# Patient Record
Sex: FEMALE | Race: WHITE | NOT HISPANIC OR LATINO | Employment: FULL TIME | URBAN - METROPOLITAN AREA
[De-identification: names, ages, dates, MRNs, and addresses within clinical notes are randomized per-mention and may not be internally consistent; named-entity substitution may affect disease eponyms.]

---

## 2024-04-16 ENCOUNTER — OFFICE VISIT (OUTPATIENT)
Dept: FAMILY MEDICINE CLINIC | Facility: CLINIC | Age: 34
End: 2024-04-16
Payer: COMMERCIAL

## 2024-04-16 VITALS
BODY MASS INDEX: 18.68 KG/M2 | HEIGHT: 67 IN | OXYGEN SATURATION: 96 % | WEIGHT: 119 LBS | HEART RATE: 114 BPM | DIASTOLIC BLOOD PRESSURE: 72 MMHG | SYSTOLIC BLOOD PRESSURE: 122 MMHG

## 2024-04-16 DIAGNOSIS — R00.2 PALPITATIONS: Primary | ICD-10-CM

## 2024-04-16 DIAGNOSIS — R10.10 UPPER ABDOMINAL PAIN: ICD-10-CM

## 2024-04-16 DIAGNOSIS — Z13.220 LIPID SCREENING: ICD-10-CM

## 2024-04-16 DIAGNOSIS — Z13.1 SCREENING FOR DIABETES MELLITUS (DM): ICD-10-CM

## 2024-04-16 PROCEDURE — 99204 OFFICE O/P NEW MOD 45 MIN: CPT | Performed by: PHYSICIAN ASSISTANT

## 2024-04-16 PROCEDURE — 93000 ELECTROCARDIOGRAM COMPLETE: CPT | Performed by: PHYSICIAN ASSISTANT

## 2024-04-16 NOTE — ASSESSMENT & PLAN NOTE
Check labs and US abd. Favor some possible mild gastritis so would recommend GERD diet if all findings are otherwise normal.

## 2024-04-16 NOTE — ASSESSMENT & PLAN NOTE
One occurrence of 30 second fast heart rate. EKG WNL. Will check fasting labs. Has had in the past with normal work up. Pt. most likely not conditioned and should more slowly get back into activity and exercising. Used to do ballet when she was younger.

## 2024-04-16 NOTE — PATIENT INSTRUCTIONS
1. Palpitations  Assessment & Plan:  One occurrence of 30 second fast heart rate. EKG WNL. Will check fasting labs. Has had in the past with normal work up. Pt. most likely not conditioned and should more slowly get back into activity and exercising. Used to do ballet when she was younger.     Orders:  -     POCT ECG  -     CBC and differential  -     Comprehensive metabolic panel  -     TSH, 3rd generation with Free T4 reflex    2. Lipid screening  -     Lipid panel    3. Screening for diabetes mellitus (DM)  -     Comprehensive metabolic panel    4. Upper abdominal pain  Assessment & Plan:  Check labs and US abd. Favor some possible mild gastritis so would recommend GERD diet if all findings are otherwise normal.     Orders:  -     US abdomen complete; Future; Expected date: 04/16/2024

## 2024-04-16 NOTE — PROGRESS NOTES
Name: Krystal Barahona      : 1990      MRN: [id number]  Encounter Provider: Rachel Seymour PA-C  Encounter Date: 2024   Encounter department: [facility]    Assessment & Plan     1. Palpitations  Assessment & Plan:  One occurrence of 30 second fast heart rate. EKG WNL. Will check fasting labs. Has had in the past with normal work up. Pt. most likely not conditioned and should more slowly get back into activity and exercising. Used to do ballet when she was younger.     Orders:  -     POCT ECG  -     CBC and differential  -     Comprehensive metabolic panel  -     TSH, 3rd generation with Free T4 reflex    2. Lipid screening  -     Lipid panel    3. Screening for diabetes mellitus (DM)  -     Comprehensive metabolic panel    4. Upper abdominal pain  Assessment & Plan:  Check labs and US abd. Favor some possible mild gastritis so would recommend GERD diet if all findings are otherwise normal.     Orders:  -     US abdomen complete; Future; Expected date: 2024        Depression Screening and Follow-up Plan: Patient was screened for depression during today's encounter. They screened negative with a PHQ-2 score of 0.        Subjective      Patient presents with:  Establish Care: Pt present today to establish care with us and to get scripts for blood work.  Abdominal Pain: Pt complaints of left upper quadrant pain for the past month on and off.  Pt did mention she has a protruding rib  Palpitations: When she was doing a physical activity    Moved from [place] originally from [place]. New to area 6 months. Has a house in all three states. Currently working fashion. Originally from [place].     Pain in her LUQ comes and goes and now more persistent. Pressure. May get worse with eating. BM is daily. Would like to check her pancrease. Increase in gas both ways more upper.     Also has hx of palpitations, had EKG in the past. Had one today. Yesterday during a walk heart beat was hard and fast less  "than 30 seconds and resolved after drinking water. Does feel out of shape but is wanting to restart gym and do blood work.           Review of Systems   Constitutional: Negative.    HENT: Negative.     Eyes: Negative.    Respiratory: Negative.     Cardiovascular:  Positive for palpitations.   Gastrointestinal:  Positive for abdominal pain.   Endocrine: Negative.    Genitourinary: Negative.    Musculoskeletal: Negative.    Skin: Negative.    Allergic/Immunologic: Negative.    Neurological: Negative.    Hematological: Negative.    Psychiatric/Behavioral: Negative.         No current outpatient medications on file prior to visit.       Objective     /72 (BP Location: Right arm, Patient Position: Sitting, Cuff Size: Standard)   Pulse (!) 114   Ht 5' 6.75\" (1.695 m)   Wt 54 kg (119 lb)   SpO2 96%   BMI 18.78 kg/m²     Physical Exam  Vitals and nursing note reviewed.   Constitutional:       General: She is not in acute distress.     Appearance: She is well-developed. She is not diaphoretic.   HENT:      Head: Normocephalic and atraumatic.      Nose: Nose normal.   Eyes:      General:         Right eye: No discharge.         Left eye: No discharge.      Conjunctiva/sclera: Conjunctivae normal.   Neck:      Vascular: No carotid bruit.   Cardiovascular:      Rate and Rhythm: Normal rate and regular rhythm.      Heart sounds: Normal heart sounds. No murmur heard.     No friction rub. No gallop.   Pulmonary:      Effort: Pulmonary effort is normal. No respiratory distress.      Breath sounds: Normal breath sounds. No wheezing or rales.   Musculoskeletal:      Cervical back: Neck supple.   Skin:     General: Skin is warm and dry.   Neurological:      Mental Status: She is alert and oriented to person, place, and time.   Psychiatric:         Judgment: Judgment normal.       Rachel Seymour PA-C    "

## 2024-04-25 ENCOUNTER — TELEPHONE (OUTPATIENT)
Age: 34
End: 2024-04-25

## 2024-04-25 ENCOUNTER — TELEPHONE (OUTPATIENT)
Dept: FAMILY MEDICINE CLINIC | Facility: CLINIC | Age: 34
End: 2024-04-25

## 2024-04-25 ENCOUNTER — HOSPITAL ENCOUNTER (OUTPATIENT)
Dept: ULTRASOUND IMAGING | Facility: HOSPITAL | Age: 34
Discharge: HOME | End: 2024-04-25
Payer: COMMERCIAL

## 2024-04-25 DIAGNOSIS — R10.10 UPPER ABDOMINAL PAIN: ICD-10-CM

## 2024-04-25 LAB
ALBUMIN SERPL-MCNC: 4.2 G/DL (ref 3.9–4.9)
ALBUMIN/GLOB SERPL: 1.8 {RATIO} (ref 1.2–2.2)
ALP SERPL-CCNC: 67 IU/L (ref 44–121)
ALT SERPL-CCNC: 14 IU/L (ref 0–32)
AST SERPL-CCNC: 18 IU/L (ref 0–40)
BASOPHILS # BLD AUTO: 0 X10E3/UL (ref 0–0.2)
BASOPHILS NFR BLD AUTO: 1 %
BILIRUB SERPL-MCNC: 0.6 MG/DL (ref 0–1.2)
BUN SERPL-MCNC: 10 MG/DL (ref 6–20)
BUN/CREAT SERPL: 13 (ref 9–23)
CALCIUM SERPL-MCNC: 8.7 MG/DL (ref 8.7–10.2)
CHLORIDE SERPL-SCNC: 104 MMOL/L (ref 96–106)
CHOLEST SERPL-MCNC: 133 MG/DL (ref 100–199)
CO2 SERPL-SCNC: 22 MMOL/L (ref 20–29)
CREAT SERPL-MCNC: 0.79 MG/DL (ref 0.57–1)
EGFR: 101 ML/MIN/1.73
EOSINOPHIL # BLD AUTO: 0.3 X10E3/UL (ref 0–0.4)
EOSINOPHIL NFR BLD AUTO: 5 %
ERYTHROCYTE [DISTWIDTH] IN BLOOD BY AUTOMATED COUNT: 11.5 % (ref 11.7–15.4)
GLOBULIN SER-MCNC: 2.3 G/DL (ref 1.5–4.5)
GLUCOSE SERPL-MCNC: 87 MG/DL (ref 70–99)
HCT VFR BLD AUTO: 36.5 % (ref 34–46.6)
HDLC SERPL-MCNC: 69 MG/DL
HGB BLD-MCNC: 12 G/DL (ref 11.1–15.9)
IMM GRANULOCYTES # BLD: 0 X10E3/UL (ref 0–0.1)
IMM GRANULOCYTES NFR BLD: 0 %
LDLC SERPL CALC-MCNC: 51 MG/DL (ref 0–99)
LYMPHOCYTES # BLD AUTO: 1.7 X10E3/UL (ref 0.7–3.1)
LYMPHOCYTES NFR BLD AUTO: 28 %
MCH RBC QN AUTO: 31 PG (ref 26.6–33)
MCHC RBC AUTO-ENTMCNC: 32.9 G/DL (ref 31.5–35.7)
MCV RBC AUTO: 94 FL (ref 79–97)
MONOCYTES # BLD AUTO: 0.5 X10E3/UL (ref 0.1–0.9)
MONOCYTES NFR BLD AUTO: 9 %
NEUTROPHILS # BLD AUTO: 3.4 X10E3/UL (ref 1.4–7)
NEUTROPHILS NFR BLD AUTO: 57 %
PLATELET # BLD AUTO: 228 X10E3/UL (ref 150–450)
POTASSIUM SERPL-SCNC: 4.2 MMOL/L (ref 3.5–5.2)
PROT SERPL-MCNC: 6.5 G/DL (ref 6–8.5)
RBC # BLD AUTO: 3.87 X10E6/UL (ref 3.77–5.28)
SL AMB VLDL CHOLESTEROL CALC: 13 MG/DL (ref 5–40)
SODIUM SERPL-SCNC: 139 MMOL/L (ref 134–144)
TRIGL SERPL-MCNC: 62 MG/DL (ref 0–149)
TSH SERPL DL<=0.005 MIU/L-ACNC: 1.43 UIU/ML (ref 0.45–4.5)
WBC # BLD AUTO: 5.9 X10E3/UL (ref 3.4–10.8)

## 2024-04-25 PROCEDURE — 76700 US EXAM ABDOM COMPLETE: CPT

## 2024-04-25 NOTE — TELEPHONE ENCOUNTER
----- Message from Rachel Seymour PA-C sent at 4/25/2024  1:09 PM EDT -----  Please let pt know her lab results are picture perfect! Great job!

## 2024-04-25 NOTE — TELEPHONE ENCOUNTER
Patient called to obtain her lab results. I did share the results with the patient who had no questions or concerns.

## 2024-05-01 NOTE — RESULT ENCOUNTER NOTE
Please of the patient know that her ultrasound abdomen was within normal limits if she is still having symptoms further evaluation would be needed.

## 2024-05-14 ENCOUNTER — OFFICE VISIT (OUTPATIENT)
Dept: OBGYN CLINIC | Facility: MEDICAL CENTER | Age: 34
End: 2024-05-14
Payer: COMMERCIAL

## 2024-05-14 VITALS
WEIGHT: 121.6 LBS | BODY MASS INDEX: 18.43 KG/M2 | SYSTOLIC BLOOD PRESSURE: 112 MMHG | HEIGHT: 68 IN | DIASTOLIC BLOOD PRESSURE: 72 MMHG

## 2024-05-14 DIAGNOSIS — Z01.419 ENCOUNTER FOR WELL WOMAN EXAM WITH ROUTINE GYNECOLOGICAL EXAM: Primary | ICD-10-CM

## 2024-05-14 DIAGNOSIS — Z12.4 CERVICAL CANCER SCREENING: ICD-10-CM

## 2024-05-14 PROCEDURE — 99385 PREV VISIT NEW AGE 18-39: CPT | Performed by: OBSTETRICS & GYNECOLOGY

## 2024-05-14 PROCEDURE — G0476 HPV COMBO ASSAY CA SCREEN: HCPCS | Performed by: OBSTETRICS & GYNECOLOGY

## 2024-05-14 PROCEDURE — G0145 SCR C/V CYTO,THINLAYER,RESCR: HCPCS | Performed by: OBSTETRICS & GYNECOLOGY

## 2024-05-14 RX ORDER — MULTIVITAMIN WITH IRON
TABLET ORAL DAILY
COMMUNITY

## 2024-05-14 RX ORDER — OMEGA-3S/DHA/EPA/FISH OIL/D3 300MG-1000
400 CAPSULE ORAL DAILY
COMMUNITY

## 2024-05-14 NOTE — PROGRESS NOTES
ASSESSMENT & PLAN: Krystal Barahona is a 34 y.o.  with normal gynecologic exam.    1.  Routine well woman exam done today  2.  Pap and HPV:  The patient's last pap and hpv was unknown .    It was normal.    Pap and cotesting was done today.    Current ASCCP Guidelines reviewed.   3.  The following were reviewed in today's visit: breast self exam, family planning choices, exercise, and healthy diet.      CC:  Annual Gynecologic Examination    HPI: Krystal Barahona is a 34 y.o.  who presents for annual gynecologic examination.  She has the following concerns:  none      Health Maintenance:    She wears her seatbelt routinely.    She does perform regular monthly self breast exams.    She feels safe at home.     History reviewed. No pertinent past medical history.    History reviewed. No pertinent surgical history.    Past OB/Gyn History:  OB History          1    Para        Term                AB   1    Living             SAB        IAB        Ectopic        Multiple        Live Births                     Family History   Problem Relation Age of Onset    No Known Problems Mother     Hyperlipidemia Father        Social History:  Social History     Socioeconomic History    Marital status: Single     Spouse name: Not on file    Number of children: Not on file    Years of education: Not on file    Highest education level: Not on file   Occupational History    Not on file   Tobacco Use    Smoking status: Never    Smokeless tobacco: Never   Vaping Use    Vaping status: Former   Substance and Sexual Activity    Alcohol use: Not Currently    Drug use: Not Currently    Sexual activity: Yes     Partners: Male   Other Topics Concern    Not on file   Social History Narrative    Not on file     Social Determinants of Health     Financial Resource Strain: Not on file   Food Insecurity: Not on file   Transportation Needs: Not on file   Physical Activity: Not on file   Stress: Not on file   Social Connections:  "Not on file   Intimate Partner Violence: Not on file   Housing Stability: Not on file       No Known Allergies      Current Outpatient Medications:     cholecalciferol (VITAMIN D3) 400 units tablet, Take 400 Units by mouth daily, Disp: , Rfl:     Multiple Vitamins-Minerals (Hair Skin and Nails Formula) TABS, Take by mouth, Disp: , Rfl:     Multiple Vitamins-Minerals (ONE A DAY IMMUNITY DEFENSE PO), Take by mouth, Disp: , Rfl:     vitamin B-12 (CYANOCOBALAMIN) 50 MCG tablet, Take by mouth daily, Disp: , Rfl:       Review of Systems  Constitutional :no fever, feels well, no tiredness, no recent weight gain or loss  ENT: no ear ache, no loss of hearing, no nosebleeds or nasal discharge, no sore throat or hoarseness.  Cardiovascular: no complaints of slow or fast heart beat, no chest pain, no palpitations, no leg claudication or lower extremity edema.  Respiratory: no complaints of shortness of shortness of breath, no NORRIS  Breasts:no complaints of breast pain, breast lump, or nipple discharge  Gastrointestinal: no complaints of abdominal pain, constipation, nausea, vomiting, or diarrhea or bloody stools  Genitourinary : no complaints of dysuria, incontinence, pelvic pain, no dysmenorrhea, vaginal discharge or abnormal vaginal bleeding and as noted in HPI.  Musculoskeletal: no complaints of arthralgia, no myalgia, no joint swelling or stiffness, no limb pain or swelling.  Integumentary: no complaints of skin rash or lesion, itching or dry skin  Neurological: no complaints of headache, no confusion, no numbness or tingling, no dizziness or fainting    Objective      /72 (BP Location: Left arm, Patient Position: Sitting)   Ht 5' 8\" (1.727 m)   Wt 55.2 kg (121 lb 9.6 oz)   BMI 18.49 kg/m²   General:   appears stated age, cooperative, alert normal mood and affect   Lungs: Unlabored breathing    Breasts: normal appearance, no masses or tenderness, Inspection negative, No nipple retraction or dimpling, No nipple " discharge or bleeding, No axillary or supraclavicular adenopathy, Normal to palpation without dominant masses   Abdomen: soft, non-tender, without masses or organomegaly   Vulva: normal, normal female genitalia, Bartholin's, Urethra, [place] normal, no lesions, normal female hair distribution, no clitoral enlargement   Vagina: normal vagina, no discharge, exudate, lesion, or erythema   Urethra: normal   Cervix: Normal, no discharge. Nontender.   Uterus: normal size, contour, position, consistency, mobility, non-tender   Adnexa: no mass, fullness, tenderness   Psychiatric orientation to person, place, and time: normal. mood and affect: normal

## 2024-05-15 LAB
HPV HR 12 DNA CVX QL NAA+PROBE: NEGATIVE
HPV16 DNA CVX QL NAA+PROBE: NEGATIVE
HPV18 DNA CVX QL NAA+PROBE: NEGATIVE

## 2024-05-21 LAB
LAB AP GYN PRIMARY INTERPRETATION: NORMAL
Lab: NORMAL
PATH INTERP SPEC-IMP: NORMAL

## 2024-12-02 ENCOUNTER — NURSE TRIAGE (OUTPATIENT)
Dept: OTHER | Facility: OTHER | Age: 34
End: 2024-12-02

## 2024-12-02 ENCOUNTER — TELEPHONE (OUTPATIENT)
Age: 34
End: 2024-12-02

## 2024-12-02 ENCOUNTER — OFFICE VISIT (OUTPATIENT)
Dept: FAMILY MEDICINE CLINIC | Facility: CLINIC | Age: 34
End: 2024-12-02

## 2024-12-02 VITALS
OXYGEN SATURATION: 99 % | HEIGHT: 68 IN | WEIGHT: 128 LBS | BODY MASS INDEX: 19.4 KG/M2 | DIASTOLIC BLOOD PRESSURE: 70 MMHG | SYSTOLIC BLOOD PRESSURE: 128 MMHG | HEART RATE: 110 BPM

## 2024-12-02 DIAGNOSIS — O21.9 NAUSEA/VOMITING IN PREGNANCY: Primary | ICD-10-CM

## 2024-12-02 PROCEDURE — 99213 OFFICE O/P EST LOW 20 MIN: CPT | Performed by: PHYSICIAN ASSISTANT

## 2024-12-02 RX ORDER — ONDANSETRON 4 MG/1
4 TABLET, FILM COATED ORAL EVERY 8 HOURS PRN
Qty: 20 TABLET | Refills: 0 | Status: SHIPPED | OUTPATIENT
Start: 2024-12-02

## 2024-12-02 NOTE — TELEPHONE ENCOUNTER
"Patient scheduled appointment with PCP, called to ask for sooner appointment, unable to reschedule. Please call. Thank you.    Patient asking for OB/GYN appointment as well. Please call to schedule. Thank you.           Reason for Disposition   MILD-MODERATE vomiting (e.g., 1 - 5 times / day) or nausea    Answer Assessment - Initial Assessment Questions  1. SEVERITY - NAUSEA: \"How bad is the nausea?\" (e.g., none; mild, moderate, severe)      Mild  2. SEVERITY - VOMITING: \"Are you vomiting?\" If Yes, ask: \"How many times have you vomited in the past 24 hours?\" (e.g., nausea only; mild, moderate or severe vomiting)      Yes, four to five times  3. ONSET: \"When did the nausea or vomiting begin?\"       Started 6 days ago  4. FLUIDS: \"What fluids or food have you vomited up today?\" \"Are you able to keep any liquids down?\"      Denies / Yes, able to keep liquids down  5. TREATMENT: \"What have you been doing so far to treat this?\"       Nothing  6. DEHYDRATION: \"When was the last time you urinated?\" \"Are you feeling lightheaded?\" \"Weight loss?\"      Last void 30 minutes ago  7. PREGNANCY: \"How many weeks pregnant are you?\" \"How has the pregnancy been going?\"      Just found out was pregnant yesterday home test. LMP 11.26.24  8. KARAN: \"What date are you expecting to deliver?\"      Unsure  9. MEDICINES: \"What medicines are you taking?\" (e.g., iron, opioid pain medicines, prenatal vitamins, vitamin B6)      Stop taking supplements because of vomiting  10. OTHER SYMPTOMS: \"Do you have any other symptoms?\" (e.g., diarrhea, fever)        Denies    Protocols used: Pregnancy - Morning Sickness (Nausea and Vomiting of Pregnancy)-Adult-AH    "

## 2024-12-02 NOTE — ASSESSMENT & PLAN NOTE
First pregnancy. Unplanned. Has OB pt in Jan. Awaiting the arrival of prenatal vitamins in the mail. Having trouble keeping down food. Looks good today with hair makeup down and no signs of dehydration. Zofran as needed. Pregnancy nutrition information given today. Discussed needing increase in fluids, avoidance of unwanted substances like nicotine, alcohol. Likes fish so should eat 2-3 servings only of low mercury fish which does include salmon. All questions answered. RTO prn.   Orders:    ondansetron (ZOFRAN) 4 mg tablet; Take 1 tablet (4 mg total) by mouth every 8 (eight) hours as needed for nausea or vomiting

## 2024-12-02 NOTE — PROGRESS NOTES
"Name: Krystal Barahona      : 1990      MRN: [id number]  Encounter Provider: Rachel Seymour PA-C  Encounter Date: 2024   Encounter department: [facility] PRIMARY CARE  :  Assessment & Plan  Nausea/vomiting in pregnancy  First pregnancy. Unplanned. Has OB pt in . Awaiting the arrival of prenatal vitamins in the mail. Having trouble keeping down food. Looks good today with hair makeup down and no signs of dehydration. Zofran as needed. Pregnancy nutrition information given today. Discussed needing increase in fluids, avoidance of unwanted substances like nicotine, alcohol. Likes fish so should eat 2-3 servings only of low mercury fish which does include salmon. All questions answered. RTO prn.   Orders:    ondansetron (ZOFRAN) 4 mg tablet; Take 1 tablet (4 mg total) by mouth every 8 (eight) hours as needed for nausea or vomiting           History of Present Illness     Patient presents with:  Nausea: Ongoing since Tuesday morning. Not eating much. Had chicken broth yesterday and threw up right away. Found out pregnant yesterday with home test. LMP end of October. Had some cramping.     First pregnancy. Has OB apt early .   Was not planned.  Ordered a prenatal vitamin through [employer] which is waiting for delivery.   Not  but has a boyfriend whom accompanies pt today.   Does like to eat salmon.         Review of Systems   Constitutional: Negative.    HENT: Negative.     Eyes: Negative.    Respiratory: Negative.     Cardiovascular: Negative.    Gastrointestinal:  Positive for nausea and vomiting.   Endocrine: Negative.    Genitourinary: Negative.    Musculoskeletal: Negative.    Skin: Negative.    Allergic/Immunologic: Negative.    Neurological: Negative.    Hematological: Negative.    Psychiatric/Behavioral: Negative.            Objective   /70 (BP Location: Right arm, Patient Position: Sitting, Cuff Size: Standard)   Pulse (!) 110   Ht 5' 8\" (1.727 m)   Wt 58.1 kg (128 lb)   " LMP 10/28/2024 (Approximate)   SpO2 99%   BMI 19.46 kg/m²      Physical Exam  Vitals and nursing note reviewed.   Constitutional:       Appearance: Normal appearance. She is well-developed.   HENT:      Head: Normocephalic and atraumatic.   Eyes:      General: Lids are normal.      Conjunctiva/sclera: Conjunctivae normal.      Pupils: Pupils are equal, round, and reactive to light.   Cardiovascular:      Rate and Rhythm: Normal rate and regular rhythm.      Heart sounds: No murmur heard.  Pulmonary:      Effort: Pulmonary effort is normal.      Breath sounds: Normal breath sounds.   Skin:     General: Skin is warm and dry.   Neurological:      General: No focal deficit present.      Mental Status: She is alert.      Coordination: Coordination is intact.   Psychiatric:         Mood and Affect: Mood normal.         Behavior: Behavior normal. Behavior is cooperative.         Thought Content: Thought content normal.         Judgment: Judgment normal.

## 2024-12-02 NOTE — TELEPHONE ENCOUNTER
Patient LMP is 10/25/24. Looked for a D&V but there was nothing available in the time frame. Please follow up with the patient with an appointment

## 2025-01-08 ENCOUNTER — TELEPHONE (OUTPATIENT)
Dept: OBGYN CLINIC | Facility: MEDICAL CENTER | Age: 35
End: 2025-01-08

## 2025-01-09 ENCOUNTER — ULTRASOUND (OUTPATIENT)
Dept: OBGYN CLINIC | Facility: MEDICAL CENTER | Age: 35
End: 2025-01-09
Payer: COMMERCIAL

## 2025-01-09 VITALS
HEIGHT: 68 IN | WEIGHT: 126 LBS | DIASTOLIC BLOOD PRESSURE: 72 MMHG | SYSTOLIC BLOOD PRESSURE: 124 MMHG | BODY MASS INDEX: 19.1 KG/M2

## 2025-01-09 DIAGNOSIS — N91.2 AMENORRHEA: Primary | ICD-10-CM

## 2025-01-09 DIAGNOSIS — Z20.828 EXPOSURE TO HERPES SIMPLEX VIRUS (HSV): ICD-10-CM

## 2025-01-09 PROCEDURE — 76801 OB US < 14 WKS SINGLE FETUS: CPT | Performed by: STUDENT IN AN ORGANIZED HEALTH CARE EDUCATION/TRAINING PROGRAM

## 2025-01-09 PROCEDURE — 99214 OFFICE O/P EST MOD 30 MIN: CPT | Performed by: STUDENT IN AN ORGANIZED HEALTH CARE EDUCATION/TRAINING PROGRAM

## 2025-01-09 NOTE — LETTER
January 9, 2025     Patient: Krystal Barahona   YOB: 1990   Date of Visit: 1/9/2025       To Whom It May Concern:    Krystal Barahona was seen in my clinic on 1/9/2025.  She is pregnant with due date 07/23/2025 and may require reasonable work accommodations throughout pregnancy such as access to food, water, and a place to sit. She should not lift more than 35 lbs.    If you have any questions or concerns, please don't hesitate to call.         Sincerely,         Klarissa Sky MD        CC: No Recipients

## 2025-01-09 NOTE — PROGRESS NOTES
"Name: Krystal Barahona      : 1990      MRN: [id number]  Encounter Provider: Klarissa Sky MD  Encounter Date: 2025   Encounter department: [facility]  :  Assessment & Plan  Amenorrhea  - Viable pregnancy 12w1d with KARAN 2025 determined by this US, discordant from LMP.  - Continue/start prenatal vitamin  - We reviewed her current medications and discussed which are safe to continue in pregnancy  - We briefly discussed options for aneuploidy screening, to be discussed further at the prenatal intake  - Referral to [facility] for NT ultrasound and aneuploidy screening placed  - Schedule prenatal intake with RN and initial prenatal visit; prenatal labs will be ordered during the prenatal intake    Orders:    Ambulatory Referral to Maternal Fetal Medicine; Future    AMB US OB < 14 weeks single or first gestation level 1    Exposure to herpes simplex virus (HSV)    Orders:    Herpes I/II IgG ANNABELLE w Reflex to HSV-2; Future        History of Present Illness   Patient notes that this pregnancy was unplanned and desired.  She was not using contraception at the time of conception. She reports she is certain of her LMP and that she has regular menses. She has had no vaginal bleeding since her LMP.        Krystal Barahona is a 34 y.o. female who presents for dating and viability US.    History obtained from: patient    Review of Systems   Constitutional:  Negative for chills and fever.   Respiratory:  Negative for cough and shortness of breath.    Cardiovascular:  Negative for chest pain and leg swelling.   Gastrointestinal:  Negative for abdominal pain, nausea and vomiting.   Genitourinary:  Negative for dysuria, frequency and urgency.   Neurological:  Negative for dizziness, light-headedness and headaches.     Medical History Reviewed by provider this encounter:     .     Objective   /72   Ht 5' 8\" (1.727 m)   Wt 57.2 kg (126 lb)   LMP 10/25/2024   BMI 19.16 kg/m²    "   Physical Exam  Constitutional:       Appearance: Normal appearance.   HENT:      Head: Normocephalic and atraumatic.   Cardiovascular:      Rate and Rhythm: Normal rate.   Pulmonary:      Effort: Pulmonary effort is normal.   Skin:     General: Skin is warm.   Neurological:      General: No focal deficit present.      Mental Status: She is alert.   Psychiatric:         Mood and Affect: Mood normal.         Pelvic Ultrasound  [place] IUP  Yolk sac: Present  Fetal Pole: Present  CRL consistent with EGA 12w1d  Cardiac activity: Present   bpm  No adnexal masses appreciated     Additional Findings: none            Assigning a Final KARAN  Please choose how you are assigning the KARAN: The gestational age by LMP is 9w 0d - 13w 6d and demonstrates more than 7 days difference from the gestational age by CRL, therefore the final KARAN will be based on the ultrasound at this encounter     Final KARAN: 07/23/2025 by ultrasound at this encounter.         Klarissa Sky MD  [facility]  [facility]  1/9/2025 10:16 AM

## 2025-01-14 ENCOUNTER — INITIAL PRENATAL (OUTPATIENT)
Dept: OBGYN CLINIC | Facility: MEDICAL CENTER | Age: 35
End: 2025-01-14
Payer: COMMERCIAL

## 2025-01-14 VITALS
BODY MASS INDEX: 18.85 KG/M2 | SYSTOLIC BLOOD PRESSURE: 120 MMHG | HEIGHT: 68 IN | WEIGHT: 124.4 LBS | DIASTOLIC BLOOD PRESSURE: 68 MMHG

## 2025-01-14 DIAGNOSIS — Z23 NEED FOR INFLUENZA VACCINATION: ICD-10-CM

## 2025-01-14 DIAGNOSIS — Z34.01 PRENATAL CARE, FIRST PREGNANCY, FIRST TRIMESTER: Primary | ICD-10-CM

## 2025-01-14 PROCEDURE — OBC

## 2025-01-14 PROCEDURE — 90471 IMMUNIZATION ADMIN: CPT

## 2025-01-14 PROCEDURE — 90656 IIV3 VACC NO PRSV 0.5 ML IM: CPT

## 2025-01-14 RX ORDER — FOLIC ACID 0.8 MG
800 TABLET ORAL DAILY
COMMUNITY

## 2025-01-14 NOTE — PROGRESS NOTES
OB INTAKE INTERVIEW 2025    Patient is 34 y.o. who presents for OB intake at 12w6d.  She is not accompanied by anyone during this encounter.  The father of her baby (Kishor) is involved in the pregnancy.      Patient's last menstrual period was 10/25/2024 (approximate).  Ultrasound: Measured 12 weeks 1 days on 2025  Estimated Date of Delivery: 25 changed by dating ultrasound.    Signs/Symptoms of Pregnancy:  Current pregnancy symptoms: mild breast tenderness  Constipation no  Headaches - occasional  Cramping-mild/spotting no  PICA cravings no    Diabetes  Body mass index is 18.91 kg/m².  History of GDM no  BMI >35 no  History of PCOS or current metformin use no  History of LGA/macrosomic infant (4000g/9lbs) no    BMI>30 no  AMA no  First degree relative with type 2 diabetes no  History of chronic HTN, hyperlipidemia, elevated A1C no  High risk race (, , ,  or ) yes    Hypertension   History of of chronic HTN no  History of gestational HTN no  History of preeclampsia, eclampsia, or HELLP syndrome no  History of diabetes no  History of lupus, sjogrens syndrome, kidney disease no    Thyroid  History of thyroid disease no    Bleeding Disorder or Hx of DVT (Factor V, antithrombin III, prothrombin gene mutation, protein C and S Ag, lupus anticoagulant, anticardiolipin, beta-2 glycoprotein): no    OB/GYN:  History of abnormal pap smear no       Date of last pap smear 2024  History of HPV no  History of Herpes/HSV no  History of other STI (gonorrhea, chlamydia, trich) no  History of prior  no  History of prior  no  History of  delivery prior to 36 weeks 6 days no  History of blood transfusion no  Ok for blood transfusion yes    Substance screening:  History of tobacco use no  Currently using tobacco no  Substance Use Screen Level  - no risk    MRSA Screening:  Does the pt have a hx of MRSA?  no    Immunizations:  History of Varicella or Vaccination - pt reports got vaccinated as a child.  Influenza vaccine given this season YES - Administered today.   Discussed Tdap vaccine YES   COVID Vaccine YES x1    Genetics/[facility]:  Do you or your partner have a history of any of the following in yourselves or first degree relatives?  Cystic fibrosis no  Spinal muscular atrophy no  Hemoglobinopathy/Sickle Cell/Thalassemia no  Fragile X Intellectual Disability no    -Patient does not desire testing for Cystic Fibrosis and Spinal Muscular Atrophy at this time.  ACOG Patient Education mailed to patient. Will let me know if would like it ordered.  -Appointment for Nuchal Translucency Ultrasound at [facility] is scheduled for 1/21.    Interview education  [facility] Pregnancy Essentials Book reviewed, discussed and attached to their AVS YES   Prenatal lab work scripts YES    Extra labs ordered: Hgb Fractionation Cascade and Varicella zoster antibody IgG    Aspirin/Preeclampsia Screen    Risk Level Risk Factor Recommendation   LOW Prior Uncomplicated full-term delivery no No Aspirin recommendation        MODERATE Nulliparity YES Recommend low-dose aspirin if     BMI>30 no 2 or more moderate risk factors    Family History Preeclampsia (mother/sister) no     35yr old or greater - currently 35 y/o will turn 35 in March      Black Race, Concern for SDOH/Low Socioeconomic YES     IVF Pregnancy  no     Personal History Risks (low birth weight, prior adverse preg outcome, >10yr preg interval) no         HIGH History of Preeclampsia no Recommend low-dose aspirin if     Multifetal gestation no 1 or more high risk factors    Chronic HTN no     Type 1 or 2 Diabetes no     Renal Disease no     Autoimmune Disease  no      Contraindications to ASA therapy:  NSAID/ ASA allergy: no  Nasal polyps: no  Asthma with history of ASA induced bronchospasm: no  Relative contraindications:  History of GI bleed: no  Active peptic ulcer disease: no  Severe  hepatic dysfunction: no    Patient does meet recommendation to take ASA 162mg during this pregnancy from 12-36wks to lower her risk of preeclampsia.  Instructions given and patient verbalized understanding.    The patient has a history now or in prior pregnancy notable for: none    Details that I feel the provider should be aware of: Krystal came in for her OB intake at 12w6d. Patient c/o mild breast tenderness and occasional headaches. Safe medications to take during pregnancy and warning signs reviewed. Prenatal panel ordered. Patient has NT scheduled with [facility] on 1/21.    PN1 visit scheduled. The patient was oriented to our practice, the navigator role, reviewed delivering physicians and [facility] for delivery. All questions were answered.    Interviewed by: Amirah Fernandes RN

## 2025-01-14 NOTE — PATIENT INSTRUCTIONS
Congratulations on your pregnancy!  We thank you for allowing us to participate in your care.    NEXT STEPS    Go to the lab to have your prenatal bloodwork completed if you have not already done so.  There is a listing of [facility] and locations in your prenatal folder. You may also visit [email or web address] or call [phone].   Please be aware that some insurance companies may require you to go to a specific lab (ex. [facility] or [facility]). You can verify this by contacting your insurance company.   If you have decided to be screened for CF and SMA genetic testing, these tests require prior authorization and scheduling.  Prior Authorization is not a guarantee of payment. There may be out of pocket expenses that includes copays, deductibles and or coinsurance for your individual plan.  Please call [phone] if our team has not contacted you in 7 business days.  Please have your blood work completed prior to you next prenatal visit.    If you have decided to have genetic testing done at Maternal Fetal Medicine, that will be scheduled by [facility]. You may have already scheduled this appointment.  If not, please call their office to schedule this appointment.  Based on the referral placed by our office, they will know how to schedule you appropriately.    Contact information for Maternal Fetal Medicine is located in your prenatal folder. The main phone number to their office is [phone].    Return to our office for your first routine prenatal visit.   [facility] has multiple locations. Always check the address of your appointment to ensure you are going to the correct office.       Warning Signs During Pregnancy - If you experience any problems or concerns, call the office directly.  The list below includes warning signs your providers would like you to be aware of.  If you experience any of these at any time during your pregnancy, please call us as soon as possible.   Vaginal bleeding  Sharp abdominal  pain that does not go away  Fever (more than 100.4?F and is not relieved with Tylenol)  Persistent vomiting lasting greater than 24 hours  Chest pain/Shortness of breath  Pain or burning when you urinate     Call the OFFICE [phone] for any questions/emergencies.  At night or on the weekend, calls go through a triage service, please indicate it is an emergency and the DOCTOR on call will be paged.    Remember to only use Your Policy Managerhart for none urgent concerns or questions.    Our doctors deliver at [facility] in [place]. The [place] [address]    Please click on the link below to review our Pregnancy Essential Guide.    [facility] Pregnancy Essentials Guide  [facility] Women's Health ([email or web address])     Click on the link below to review [facility] Lab locations.  [facility] Lab Locations    [employer] resource  [employer] is a tool to connect you to community resources you may need.

## 2025-01-14 NOTE — LETTER
January 14, 2025     Patient: Krystal Barahona  YOB: 1990  Date of Visit: 1/14/2025      To Whom it May Concern:    Krystal Barahona is under our professional care. Krystal was seen in my office on 1/14/2025. Krystal may return to work on 1/15/2025 .    If you have any questions or concerns, please don't hesitate to call.         Sincerely,      Amirah Fernandes RN

## 2025-01-17 NOTE — PATIENT INSTRUCTIONS
Thank you for choosing us for your  care today.  If you have any questions about your ultrasound or care, please do not hesitate to contact us or your primary obstetrician.        Some general instructions for your pregnancy are:    Exercise: Aim for 150 minutes per week of regular exercise.  Walking is great!  Nutrition: Choose healthy sources of calcium, iron, and protein.  Avoid ultraprocessed foods and added sugar.  Learn about Preeclampsia: preeclampsia is a common, potentially serious high blood pressure complication in pregnancy.  A blood pressure of 140mmHg (systolic or top number) or 90mmHg (diastolic or bottom number) should be evaluated by your doctor.  Aspirin is sometimes prescribed in early pregnancy to prevent preeclampsia in women with risk factors - ask your obstetrician if you should be on this medication.  For more resources, visit:  https://www.highriskpregnancyinfo.org/preeclampsia  If you smoke, please try to quit completely but also try to reduce your smoking by as much as possible (as soon as possible).  Do not vape.  Please also avoid cannabis products.  Other warning signs to watch out for in pregnancy or postpartum: chest pain, obstructed breathing or shortness of breath, seizures, thoughts of hurting yourself or your baby, bleeding, a painful or swollen leg, fever, or headache (see [facility] Warning Signs campaign).  If these happen call 911.  Itching is also not normal in pregnancy and if you experience this, especially over your hands and feet, potentially worse at night, notify your doctors.     The use of low dose aspirin in pregnancy (162mg) is recommended in women with an increased risk for preeclampsia, and was recommended today for you.  When started early in pregnancy (ideally 12-16 weeks but can be started as late as 28 weeks), low dose aspirin can reduce your risk of preeclampsia.  Low dose aspirin has been shown to be safe and without significant maternal or  fetal risk.  Side effects are generally none to mild, however, if you experience what you think may be an allergic reaction after starting aspirin, immediately stop it and notify your doctor.  If you have asthma or acid reflux and notice an increase in symptom frequency or severity, consider stopping this medication, and notify your doctor.  If you have had bariatric surgery, you may need a different dose of medication with or without acid-reducing medication.  We advise routine discontinuation of this medication at 36 weeks.  For more resources, visit:  https://mothertobaby.org/fact-sheets/low-dose-aspirin/  https://www.preeclampsia.org/aspirin  https://www.highriskpregnancyinfo.org/preeclampsia

## 2025-01-20 ENCOUNTER — APPOINTMENT (OUTPATIENT)
Dept: LAB | Facility: CLINIC | Age: 35
End: 2025-01-20
Payer: COMMERCIAL

## 2025-01-20 DIAGNOSIS — Z20.828 EXPOSURE TO HERPES SIMPLEX VIRUS (HSV): ICD-10-CM

## 2025-01-20 DIAGNOSIS — Z34.01 PRENATAL CARE, FIRST PREGNANCY, FIRST TRIMESTER: ICD-10-CM

## 2025-01-20 PROBLEM — O09.511 PRIMIGRAVIDA OF ADVANCED MATERNAL AGE IN FIRST TRIMESTER: Status: ACTIVE | Noted: 2025-01-20

## 2025-01-20 LAB
ABO GROUP BLD: NORMAL
ALBUMIN SERPL BCG-MCNC: 3.9 G/DL (ref 3.5–5)
ALP SERPL-CCNC: 47 U/L (ref 34–104)
ALT SERPL W P-5'-P-CCNC: 11 U/L (ref 7–52)
ANION GAP SERPL CALCULATED.3IONS-SCNC: 8 MMOL/L (ref 4–13)
AST SERPL W P-5'-P-CCNC: 14 U/L (ref 13–39)
BACTERIA UR QL AUTO: ABNORMAL /HPF
BASOPHILS # BLD MANUAL: 0.07 THOUSAND/UL (ref 0–0.1)
BASOPHILS NFR MAR MANUAL: 1 % (ref 0–1)
BILIRUB SERPL-MCNC: 0.33 MG/DL (ref 0.2–1)
BILIRUB UR QL STRIP: NEGATIVE
BLD GP AB SCN SERPL QL: NEGATIVE
BUN SERPL-MCNC: 7 MG/DL (ref 5–25)
CALCIUM SERPL-MCNC: 8.8 MG/DL (ref 8.4–10.2)
CAOX CRY URNS QL MICRO: ABNORMAL /HPF
CHLORIDE SERPL-SCNC: 100 MMOL/L (ref 96–108)
CHOLEST SERPL-MCNC: 153 MG/DL (ref ?–200)
CLARITY UR: ABNORMAL
CO2 SERPL-SCNC: 25 MMOL/L (ref 21–32)
COLOR UR: YELLOW
CREAT SERPL-MCNC: 0.54 MG/DL (ref 0.6–1.3)
EOSINOPHIL # BLD MANUAL: 0.13 THOUSAND/UL (ref 0–0.4)
EOSINOPHIL NFR BLD MANUAL: 2 % (ref 0–6)
ERYTHROCYTE [DISTWIDTH] IN BLOOD BY AUTOMATED COUNT: 13 % (ref 11.6–15.1)
GFR SERPL CREATININE-BSD FRML MDRD: 123 ML/MIN/1.73SQ M
GLUCOSE SERPL-MCNC: 102 MG/DL (ref 65–140)
GLUCOSE UR STRIP-MCNC: NEGATIVE MG/DL
HCT VFR BLD AUTO: 36 % (ref 34.8–46.1)
HDLC SERPL-MCNC: 71 MG/DL
HGB BLD-MCNC: 11.5 G/DL (ref 11.5–15.4)
HGB UR QL STRIP.AUTO: NEGATIVE
HIV 1+2 AB+HIV1 P24 AG SERPL QL IA: NORMAL
HIV 2 AB SERPL QL IA: NORMAL
HIV1 AB SERPL QL IA: NORMAL
HIV1 P24 AG SERPL QL IA: NORMAL
KETONES UR STRIP-MCNC: NEGATIVE MG/DL
LDLC SERPL CALC-MCNC: 61 MG/DL (ref 0–100)
LEUKOCYTE ESTERASE UR QL STRIP: NEGATIVE
LYMPHOCYTES # BLD AUTO: 2.17 THOUSAND/UL (ref 0.6–4.47)
LYMPHOCYTES # BLD AUTO: 33 % (ref 14–44)
MCH RBC QN AUTO: 30.7 PG (ref 26.8–34.3)
MCHC RBC AUTO-ENTMCNC: 31.9 G/DL (ref 31.4–37.4)
MCV RBC AUTO: 96 FL (ref 82–98)
MONOCYTES # BLD AUTO: 0.26 THOUSAND/UL (ref 0–1.22)
MONOCYTES NFR BLD: 4 % (ref 4–12)
MUCOUS THREADS UR QL AUTO: ABNORMAL
NEUTROPHILS # BLD MANUAL: 3.95 THOUSAND/UL (ref 1.85–7.62)
NEUTS SEG NFR BLD AUTO: 60 % (ref 43–75)
NITRITE UR QL STRIP: NEGATIVE
NON-SQ EPI CELLS URNS QL MICRO: ABNORMAL /HPF
NONHDLC SERPL-MCNC: 82 MG/DL
PH UR STRIP.AUTO: 6.5 [PH]
PLATELET # BLD AUTO: 223 THOUSANDS/UL (ref 149–390)
PLATELET BLD QL SMEAR: ADEQUATE
PMV BLD AUTO: 10.9 FL (ref 8.9–12.7)
POLYCHROMASIA BLD QL SMEAR: PRESENT
POTASSIUM SERPL-SCNC: 3.6 MMOL/L (ref 3.5–5.3)
PROT SERPL-MCNC: 6.9 G/DL (ref 6.4–8.4)
PROT UR STRIP-MCNC: ABNORMAL MG/DL
RBC # BLD AUTO: 3.75 MILLION/UL (ref 3.81–5.12)
RBC #/AREA URNS AUTO: ABNORMAL /HPF
RBC MORPH BLD: PRESENT
RH BLD: NEGATIVE
RUBV IGG SERPL IA-ACNC: 10.4 IU/ML
SODIUM SERPL-SCNC: 133 MMOL/L (ref 135–147)
SP GR UR STRIP.AUTO: 1.02 (ref 1–1.03)
SPECIMEN EXPIRATION DATE: NORMAL
TREPONEMA PALLIDUM IGG+IGM AB [PRESENCE] IN SERUM OR PLASMA BY IMMUNOASSAY: NORMAL
TRIGL SERPL-MCNC: 105 MG/DL (ref ?–150)
TSH SERPL DL<=0.05 MIU/L-ACNC: 0.64 UIU/ML (ref 0.45–4.5)
UROBILINOGEN UR STRIP-ACNC: <2 MG/DL
VZV IGG SER QL IA: ABNORMAL
WBC # BLD AUTO: 6.59 THOUSAND/UL (ref 4.31–10.16)
WBC #/AREA URNS AUTO: ABNORMAL /HPF

## 2025-01-20 PROCEDURE — 87086 URINE CULTURE/COLONY COUNT: CPT

## 2025-01-20 PROCEDURE — 86850 RBC ANTIBODY SCREEN: CPT

## 2025-01-20 PROCEDURE — 86803 HEPATITIS C AB TEST: CPT

## 2025-01-20 PROCEDURE — 87389 HIV-1 AG W/HIV-1&-2 AB AG IA: CPT

## 2025-01-20 PROCEDURE — 86695 HERPES SIMPLEX TYPE 1 TEST: CPT

## 2025-01-20 PROCEDURE — 86780 TREPONEMA PALLIDUM: CPT

## 2025-01-20 PROCEDURE — 86706 HEP B SURFACE ANTIBODY: CPT

## 2025-01-20 PROCEDURE — 87340 HEPATITIS B SURFACE AG IA: CPT

## 2025-01-20 PROCEDURE — 83020 HEMOGLOBIN ELECTROPHORESIS: CPT

## 2025-01-20 PROCEDURE — 86900 BLOOD TYPING SEROLOGIC ABO: CPT

## 2025-01-20 PROCEDURE — 36415 COLL VENOUS BLD VENIPUNCTURE: CPT

## 2025-01-20 PROCEDURE — 86696 HERPES SIMPLEX TYPE 2 TEST: CPT

## 2025-01-20 PROCEDURE — 86762 RUBELLA ANTIBODY: CPT

## 2025-01-20 PROCEDURE — 86787 VARICELLA-ZOSTER ANTIBODY: CPT

## 2025-01-20 PROCEDURE — 86901 BLOOD TYPING SEROLOGIC RH(D): CPT

## 2025-01-20 PROCEDURE — 81001 URINALYSIS AUTO W/SCOPE: CPT

## 2025-01-21 ENCOUNTER — ROUTINE PRENATAL (OUTPATIENT)
Dept: PERINATAL CARE | Facility: OTHER | Age: 35
End: 2025-01-21
Payer: COMMERCIAL

## 2025-01-21 VITALS
DIASTOLIC BLOOD PRESSURE: 70 MMHG | BODY MASS INDEX: 19.64 KG/M2 | SYSTOLIC BLOOD PRESSURE: 112 MMHG | WEIGHT: 129.6 LBS | HEART RATE: 104 BPM | HEIGHT: 68 IN

## 2025-01-21 DIAGNOSIS — Z3A.13 13 WEEKS GESTATION OF PREGNANCY: ICD-10-CM

## 2025-01-21 DIAGNOSIS — N91.2 AMENORRHEA: ICD-10-CM

## 2025-01-21 DIAGNOSIS — Z36.0 ENCOUNTER FOR ANTENATAL SCREENING FOR CHROMOSOMAL ANOMALIES: ICD-10-CM

## 2025-01-21 DIAGNOSIS — O09.511 PRIMIGRAVIDA OF ADVANCED MATERNAL AGE IN FIRST TRIMESTER: Primary | ICD-10-CM

## 2025-01-21 DIAGNOSIS — Z36.82 ENCOUNTER FOR (NT) NUCHAL TRANSLUCENCY SCAN: ICD-10-CM

## 2025-01-21 LAB
BACTERIA UR CULT: NORMAL
HBV SURFACE AB SER-ACNC: <3 MIU/ML
HBV SURFACE AG SER QL: NORMAL
HCV AB SER QL: NORMAL

## 2025-01-21 PROCEDURE — 99243 OFF/OP CNSLTJ NEW/EST LOW 30: CPT | Performed by: NURSE PRACTITIONER

## 2025-01-21 PROCEDURE — 76801 OB US < 14 WKS SINGLE FETUS: CPT | Performed by: OBSTETRICS & GYNECOLOGY

## 2025-01-21 PROCEDURE — 36415 COLL VENOUS BLD VENIPUNCTURE: CPT | Performed by: OBSTETRICS & GYNECOLOGY

## 2025-01-21 PROCEDURE — 76813 OB US NUCHAL MEAS 1 GEST: CPT | Performed by: OBSTETRICS & GYNECOLOGY

## 2025-01-21 RX ORDER — ASPIRIN 81 MG/1
162 TABLET ORAL DAILY
Qty: 180 TABLET | Refills: 3 | Status: SHIPPED | OUTPATIENT
Start: 2025-01-21

## 2025-01-21 NOTE — PROGRESS NOTES
Patient chose to have [facility] [id number] Non-Invasive Prenatal Screen [id number] PLUS w/ SCA, WITH fetal sex.  Patient choose to be billed through insurance.     Patient given brochure and is aware [facility] will contact patient's insurance and coordinate coverage.  Provided [facility] contact information. General inquiries [phone], Cost estimates [phone] and [facility] Billing [phone]. Website [email or web address].     Blood collection tubes labeled with patient identifiers (name, medical record number, and date of birth).     Filled out [facility] order form. Patient chose to have blood drawn in our office at time of visit. NIPS was drawn from right arm with a straight needle by Yi LOVE .      Maternal [facility]etal Medicine will have results in approximately 5-7 business days and will call patient or notify via [employer].  Patient aware viewing lab result online will reveal fetal sex if ordered.    Patient verbalized understanding of all instructions and no questions at this time.

## 2025-01-21 NOTE — LETTER
2025     Klarissa Sky MD  [address] [place] [address]  [address] [place] [address]    Patient: Krystal Barahona   YOB: 1990   Date of Visit: 2025       Dear Dr. Sky:    Thank you for referring Krystal Barahona to me for evaluation. Below are my notes for this consultation.    If you have questions, please do not hesitate to call me. I look forward to following your patient along with you.         Sincerely,        Kalli Martinez MD        CC: No Recipients    Kalli Martinez MD  2025  8:18 PM  Sign when Signing Visit  OFFICE CONSULT      Dear Dr. Sky,     Thank you for requesting a  consultation on your patient Krystal Barahona for the following indications:  Genetic screening    History  Medications: Multi vitamin, folic acid, vitamin D and vitamin B12  Allergies to medications: No known drug allergies.  Past medical history: Advanced maternal age  Past surgical history: Denies  Past obstetrical history: .  Krystal has a history of 2 elective terminations.  Social history: Denies current use of alcohol, tobacco or drugs of abuse.  First generation family history: Noncontributory    Ultrasound findings:  The ultrasound shows a fetus concordant with dates. The nasal bone and nuchal translucency appear normal. No malformations are seen on today's early ultrasound.       She does not report any vaginal bleeding or uterine cramping or contractions.      Specific counseling was provided on the following problems:  1. We discussed the options for genetic screening which include invasive testing on the fetal placenta or on fetal skin cells within the amniotic fluid and compared this to noninvasive testing which includes cell free DNA screening and the sequential screen.  We reviewed the risks, the benefits and the limitations of each.  In the end patient chose to complete the cell free DNA screen.    2. Increased maternal medical risks with advanced maternal  age include gestational diabetes, hypertensive complications,  delivery,  delivery, hepatic complications including acute fatty liver of pregnancy and HELLP syndrome, and peripartum cardiomyopathy.  These risks can be mitigated but not eliminated by optimizing maternal medical health and optimizing weight gain.  We generally advise a third trimester growth assessment for the indication of advanced maternal age. An early 1 hour glucola screen ordered today.     3.  The use of low dose aspirin in pregnancy (162mg) is recommended in women with a high risk, or multiple moderate risk factors for preeclampsia.  Aspirin therapy should be initiated between 12-28 weeks gestation, and is most effective if started prior to 16 weeks gestation, and stopped by 36 weeks gestation. Low dose aspirin in pregnancy has been shown to reduce the incidence of preeclampsia in women with risk factors, and has been shown to be safe and without significant maternal or fetal risk. In light of her risk factors (first pregnancy and AMA), I recommend initiating aspirin therapy, which was prescribed today.        4.  We reviewed current recommendations regarding  Flu, COVID and RSV (third trimester) vaccines by the American College of Obstetricians and Gynecologists and the Society for Maternal-Fetal Medicine. We discussed reassuring pregnancy outcome information after vaccination. We discussed the increased severity of infections and the resultant maternal and fetal complications that can arise with a severe infection including  labor.  Vaccines have been found to generate  antibodies in pregnant and lactating women similar to that observed in non-pregnant women. Vaccine-induced antibody levels were significantly greater than the levels found in response to natural infection. Immune transfer of these antibodies to neonates is found to occur via the placenta and breast milk.    Future tests recommended:  The results of her  NIPT will return in 7-10 days and her OB office will order an MSAFP screen at 16-18 weeks to screen for spina bifida.       Future ultrasounds ordered today:   Fetal Level II ultrasound imaging is recommended at 19-20 weeks' gestation.      Split-shared decision-making between JOSH Gaffney and myself was utilized, with the majority of the time spent by CECIL Gaffney.  Medical decision-making for this encounter was moderate (diagnosis moderate, data moderate and risk moderate).    I reviewed the ultrasound pictures and recommended the medical decision making transcribed in the care of this patient.      Kalli Martinez MD

## 2025-01-22 ENCOUNTER — RESULTS FOLLOW-UP (OUTPATIENT)
Dept: FAMILY MEDICINE CLINIC | Facility: CLINIC | Age: 35
End: 2025-01-22

## 2025-01-22 LAB
HSV2 IGG SERPL QL IA: NEGATIVE
HSV2 IGG SERPL QL IA: POSITIVE

## 2025-01-22 NOTE — PROGRESS NOTES
OFFICE CONSULT      Dear Dr. Sky,     Thank you for requesting a  consultation on your patient Krystal Barahona for the following indications:  Genetic screening    History  Medications: Multi vitamin, folic acid, vitamin D and vitamin B12  Allergies to medications: No known drug allergies.  Past medical history: Advanced maternal age  Past surgical history: Denies  Past obstetrical history: .  Krystal has a history of 2 elective terminations.  Social history: Denies current use of alcohol, tobacco or drugs of abuse.  First generation family history: Noncontributory    Ultrasound findings:  The ultrasound shows a fetus concordant with dates. The nasal bone and nuchal translucency appear normal. No malformations are seen on today's early ultrasound.       She does not report any vaginal bleeding or uterine cramping or contractions.      Specific counseling was provided on the following problems:  1. We discussed the options for genetic screening which include invasive testing on the fetal placenta or on fetal skin cells within the amniotic fluid and compared this to noninvasive testing which includes cell free DNA screening and the sequential screen.  We reviewed the risks, the benefits and the limitations of each.  In the end patient chose to complete the cell free DNA screen.    2. Increased maternal medical risks with advanced maternal age include gestational diabetes, hypertensive complications,  delivery,  delivery, hepatic complications including acute fatty liver of pregnancy and HELLP syndrome, and peripartum cardiomyopathy.  These risks can be mitigated but not eliminated by optimizing maternal medical health and optimizing weight gain.  We generally advise a third trimester growth assessment for the indication of advanced maternal age. An early 1 hour glucola screen ordered today.     3.  The use of low dose aspirin in pregnancy (162mg) is recommended in women with a high  risk, or multiple moderate risk factors for preeclampsia.  Aspirin therapy should be initiated between 12-28 weeks gestation, and is most effective if started prior to 16 weeks gestation, and stopped by 36 weeks gestation. Low dose aspirin in pregnancy has been shown to reduce the incidence of preeclampsia in women with risk factors, and has been shown to be safe and without significant maternal or fetal risk. In light of her risk factors (first pregnancy and AMA), I recommend initiating aspirin therapy, which was prescribed today.        4.  We reviewed current recommendations regarding  Flu, COVID and RSV (third trimester) vaccines by the American College of Obstetricians and Gynecologists and the Society for Maternal-Fetal Medicine. We discussed reassuring pregnancy outcome information after vaccination. We discussed the increased severity of infections and the resultant maternal and fetal complications that can arise with a severe infection including  labor.  Vaccines have been found to generate  antibodies in pregnant and lactating women similar to that observed in non-pregnant women. Vaccine-induced antibody levels were significantly greater than the levels found in response to natural infection. Immune transfer of these antibodies to neonates is found to occur via the placenta and breast milk.    Future tests recommended:  The results of her NIPT will return in 7-10 days and her OB office will order an MSAFP screen at 16-18 weeks to screen for spina bifida.       Future ultrasounds ordered today:   Fetal Level II ultrasound imaging is recommended at 19-20 weeks' gestation.      Split-shared decision-making between JOSH Gaffney and myself was utilized, with the majority of the time spent by CECIL Gaffney.  Medical decision-making for this encounter was moderate (diagnosis moderate, data moderate and risk moderate).    I reviewed the ultrasound pictures and recommended the medical decision making  transcribed in the care of this patient.      Kalli Martinez MD

## 2025-01-23 ENCOUNTER — RESULTS FOLLOW-UP (OUTPATIENT)
Dept: OBGYN CLINIC | Facility: MEDICAL CENTER | Age: 35
End: 2025-01-23

## 2025-01-23 LAB
HGB A MFR BLD: 2.6 % (ref 1.8–3.2)
HGB A MFR BLD: 97.4 % (ref 96.4–98.8)
HGB F MFR BLD: 0 % (ref 0–2)
HGB FRACT BLD-IMP: NORMAL
HGB S MFR BLD: 0 %

## 2025-01-23 NOTE — RESULT ENCOUNTER NOTE
Please let pt know that some of the blood work I ordered from a visit with her on 4/16/24 was run by the lab with all the rest of her pregnancy blood work. Her sodium was found to be low which could be caused if she is still having issues with vomiting with her pregnancy. I would have her make sure to replace her electrolytes if this is the case with diet Gatorade or power aid and make sure she discusses this with her OB. I will also forward results to her OB.

## 2025-01-25 LAB
CFDNA.FET/CFDNA.TOTAL SFR FETUS: NORMAL %
CITATION REF LAB TEST: NORMAL
FET 13+18+21+X+Y ANEUP PLAS.CFDNA: NEGATIVE
FET CHR 21 TS PLAS.CFDNA QL: NEGATIVE
FET CHR 21 TS PLAS.CFDNA QL: NEGATIVE
FET MS X RISK WBC.DNA+CFDNA QL: NOT DETECTED
FET SEX PLAS.CFDNA DOSAGE CFDNA: NORMAL
FET TS 13 RISK PLAS.CFDNA QL: NEGATIVE
FET X + Y ANEUP RISK PLAS.CFDNA SEQ-IMP: NOT DETECTED
GA EST FROM CONCEPTION DATE: NORMAL D
GESTATIONAL AGE > 9:: YES
LAB DIRECTOR NAME PROVIDER: NORMAL
LAB DIRECTOR NAME PROVIDER: NORMAL
LABORATORY COMMENT REPORT: NORMAL
LIMITATIONS OF THE TEST: NORMAL
NEGATIVE PREDICTIVE VALUE: NORMAL
PERFORMANCE CHARACTERISTICS: NORMAL
POSITIVE PREDICTIVE VALUE: NORMAL
REF LAB TEST METHOD: NORMAL
SL AMB NOTE:: NORMAL
TEST PERFORMANCE INFO SPEC: NORMAL

## 2025-01-28 ENCOUNTER — RESULTS FOLLOW-UP (OUTPATIENT)
Dept: PERINATAL CARE | Facility: OTHER | Age: 35
End: 2025-01-28

## 2025-02-02 PROBLEM — O09.512 PRIMIGRAVIDA OF ADVANCED MATERNAL AGE IN SECOND TRIMESTER: Status: ACTIVE | Noted: 2025-01-20

## 2025-02-02 PROBLEM — O26.892 RH NEGATIVE STATE IN ANTEPARTUM PERIOD, SECOND TRIMESTER: Status: ACTIVE | Noted: 2025-02-02

## 2025-02-02 PROBLEM — Z67.91 RH NEGATIVE STATE IN ANTEPARTUM PERIOD, SECOND TRIMESTER: Status: ACTIVE | Noted: 2025-02-02

## 2025-02-02 PROBLEM — Z78.9 NONIMMUNE TO HEPATITIS B VIRUS: Status: ACTIVE | Noted: 2025-02-02

## 2025-02-02 PROBLEM — Z3A.15 15 WEEKS GESTATION OF PREGNANCY: Status: ACTIVE | Noted: 2025-02-02

## 2025-02-02 PROBLEM — Z78.9 NOT IMMUNE TO RUBELLA: Status: ACTIVE | Noted: 2025-02-02

## 2025-02-02 NOTE — ASSESSMENT & PLAN NOTE
Continue prenatal vitamin and LDASA daily  Encouraged to complete 1 hour Glucola  Weight gain in pregnancy-Who BMI recommend 25-35 lb . Healthy diet and daily exercise reviewed and encouraged  Unit regimen reviewed visit every 4 weeks until 28 weeks, every 2 weeks until 36 weeks and then weekly until delivery.    Vaccines in Pregnancy      - TDAP vaccine after 27 gestational weeks     - RSV vaccine between 32-36.6 gestational weeks. September- January      - Reviewed with patient  Pregnancy with COVID infection is considered  high risk and can have poor outcome including  delivery, more severe infection.  therefore  pregnant patients are recommended to get  COVID-19 vaccination. Written information provided     - influenza October- March - had vaccine   Common discomforts of pregnancy and precautions reviewed.  Signs and symptoms to report reviewed.      Orders:    Alpha fetoprotein, maternal; Future    Chlamydia/GC amplified DNA by PCR

## 2025-02-02 NOTE — PROGRESS NOTES
Name: Krystal Barahona      : 1990      MRN: [id number]  Encounter Provider: JOSH James  Encounter Date: 2/3/2025   Encounter department: [facility]  :  Assessment & Plan  15 weeks gestation of pregnancy  Continue prenatal vitamin and LDASA daily  Encouraged to complete 1 hour Glucola  Weight gain in pregnancy-Who BMI recommend 25-35 lb . Healthy diet and daily exercise reviewed and encouraged  Unit regimen reviewed visit every 4 weeks until 28 weeks, every 2 weeks until 36 weeks and then weekly until delivery.    Vaccines in Pregnancy      - TDAP vaccine after 27 gestational weeks     - RSV vaccine between 32-36.6 gestational weeks. September- January      - Reviewed with patient  Pregnancy with COVID infection is considered  high risk and can have poor outcome including  delivery, more severe infection.  therefore  pregnant patients are recommended to get  COVID-19 vaccination. Written information provided     - influenza October- March - had vaccine   Common discomforts of pregnancy and precautions reviewed.  Signs and symptoms to report reviewed.      Orders:    Alpha fetoprotein, maternal; Future    Chlamydia/GC amplified DNA by PCR    Primigravida of advanced maternal age in second trimester  Follow-up with  center scheduled 3/11/25       Rh negative state in antepartum period, second trimester  Bleeding precautions reviewed.  Reviewed recommendation for RhoGAM       Encounter for  screening for high alpha-fetoprotein level  Completed MaterniT 21 for genetic screening  Reviewed MSAFP .  Ordered at today's visit.  Patient verbalized understanding it is a time sensitive test should have testing done prior to 21.6 gestational weeks  PNC follow up 3/11/25   Orders:    Alpha fetoprotein, maternal; Future    Screening examination for STI  Reviewed recommendation for gonorrhea/chlamydia collection  Orders:    Chlamydia/GC amplified DNA by  PCR    Nonimmune to hepatitis B virus  Recommend vaccine booster during pregnancy        Not immune to rubella  Rubella equivocal, consider MMR vaccine postpartum       Maternal varicella, non-immune  Encouraged varivax PP        RTO 4 weeks     History of Present Illness   HPI  Krystal Barahona is a 34 y.o. female who presents for Prenatal H&P     Denies loss of fluid, vaginal discharge, vaginal bleeding  and abdominal pain. Not feeling fetal movement . Tolerating PNV and LDASA well.   Prenatal panel reviewed -significant for rubella equivocal, varicella nonimmune and hepatitis B nonimmune.  Labs also resulted with exposure to HSV 1.  Met with  center completed materniT 21 for genetic testing. welcomed pregnancy.    History obtained from: patient    Review of Systems   Constitutional:  Negative for chills and fever.   Respiratory: Negative.     Cardiovascular: Negative.      Medical History Reviewed by provider this encounter:  Tobacco  Allergies  Meds  Problems  Med Hx  Surg Hx  Fam Hx  Soc   Hx    .     Objective   /70   Wt 58.3 kg (128 lb 9.6 oz)   LMP 10/25/2024 (Approximate)   BMI 19.55 kg/m²      Physical Exam        OB history:     G1-  IAB      G2-  IAB      G3- current     Dating:     LMP - 10/25/24 KARAN 25     US on 25 @  12w 1d KARAN 25  Working KAARN 25     Surgical history:     History reviewed. No pertinent surgical history.    Medical History:     History reviewed. No pertinent past medical history.    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 Reviewed depending on complaint and  gestational age may recommend evaluation at [facility]

## 2025-02-03 ENCOUNTER — INITIAL PRENATAL (OUTPATIENT)
Dept: OBGYN CLINIC | Facility: CLINIC | Age: 35
End: 2025-02-03

## 2025-02-03 VITALS — SYSTOLIC BLOOD PRESSURE: 115 MMHG | WEIGHT: 128.6 LBS | DIASTOLIC BLOOD PRESSURE: 70 MMHG | BODY MASS INDEX: 19.55 KG/M2

## 2025-02-03 DIAGNOSIS — Z28.39 MATERNAL VARICELLA, NON-IMMUNE: ICD-10-CM

## 2025-02-03 DIAGNOSIS — Z78.9 NOT IMMUNE TO RUBELLA: ICD-10-CM

## 2025-02-03 DIAGNOSIS — O26.892 RH NEGATIVE STATE IN ANTEPARTUM PERIOD, SECOND TRIMESTER: ICD-10-CM

## 2025-02-03 DIAGNOSIS — O09.899 MATERNAL VARICELLA, NON-IMMUNE: ICD-10-CM

## 2025-02-03 DIAGNOSIS — Z78.9 NONIMMUNE TO HEPATITIS B VIRUS: ICD-10-CM

## 2025-02-03 DIAGNOSIS — O09.512 PRIMIGRAVIDA OF ADVANCED MATERNAL AGE IN SECOND TRIMESTER: Primary | ICD-10-CM

## 2025-02-03 DIAGNOSIS — Z67.91 RH NEGATIVE STATE IN ANTEPARTUM PERIOD, SECOND TRIMESTER: ICD-10-CM

## 2025-02-03 DIAGNOSIS — Z36.1 ENCOUNTER FOR ANTENATAL SCREENING FOR HIGH ALPHA-FETOPROTEIN LEVEL: ICD-10-CM

## 2025-02-03 DIAGNOSIS — Z11.3 SCREENING EXAMINATION FOR STI: ICD-10-CM

## 2025-02-03 DIAGNOSIS — Z3A.15 15 WEEKS GESTATION OF PREGNANCY: ICD-10-CM

## 2025-02-03 PROCEDURE — 87591 N.GONORRHOEAE DNA AMP PROB: CPT | Performed by: NURSE PRACTITIONER

## 2025-02-03 PROCEDURE — 87491 CHLMYD TRACH DNA AMP PROBE: CPT | Performed by: NURSE PRACTITIONER

## 2025-02-03 PROCEDURE — PNV: Performed by: NURSE PRACTITIONER

## 2025-02-03 RX ORDER — FERROUS SULFATE 325(65) MG
325 TABLET, DELAYED RELEASE (ENTERIC COATED) ORAL
COMMUNITY

## 2025-02-03 RX ORDER — MAGNESIUM 30 MG
400 TABLET ORAL 2 TIMES DAILY
COMMUNITY

## 2025-02-05 ENCOUNTER — RESULTS FOLLOW-UP (OUTPATIENT)
Dept: OBGYN CLINIC | Facility: MEDICAL CENTER | Age: 35
End: 2025-02-05

## 2025-02-05 LAB
C TRACH DNA SPEC QL NAA+PROBE: NEGATIVE
N GONORRHOEA DNA SPEC QL NAA+PROBE: NEGATIVE

## 2025-02-17 ENCOUNTER — APPOINTMENT (OUTPATIENT)
Dept: LAB | Facility: CLINIC | Age: 35
End: 2025-02-17
Payer: COMMERCIAL

## 2025-02-17 DIAGNOSIS — Z3A.15 15 WEEKS GESTATION OF PREGNANCY: ICD-10-CM

## 2025-02-17 DIAGNOSIS — Z36.1 ENCOUNTER FOR ANTENATAL SCREENING FOR HIGH ALPHA-FETOPROTEIN LEVEL: ICD-10-CM

## 2025-02-17 PROCEDURE — 82105 ALPHA-FETOPROTEIN SERUM: CPT

## 2025-02-17 PROCEDURE — 36415 COLL VENOUS BLD VENIPUNCTURE: CPT

## 2025-02-19 LAB
2ND TRIMESTER 4 SCREEN SERPL-IMP: NORMAL
AFP ADJ MOM SERPL: 0.81
AFP INTERP AMN-IMP: NORMAL
AFP INTERP SERPL-IMP: NORMAL
AFP INTERP SERPL-IMP: NORMAL
AFP SERPL-MCNC: 40.5 NG/ML
AGE AT DELIVERY: 35.3 YR
GA METHOD: NORMAL
GA: 17.7 WEEKS
IDDM PATIENT QL: NO
MULTIPLE PREGNANCY: NO
NEURAL TUBE DEFECT RISK FETUS: NORMAL %

## 2025-02-24 ENCOUNTER — TELEPHONE (OUTPATIENT)
Dept: OBGYN CLINIC | Facility: MEDICAL CENTER | Age: 35
End: 2025-02-24

## 2025-02-24 NOTE — TELEPHONE ENCOUNTER
2ND TRIMESTER CHECK-IN CALL      Overall how are you doing? Patient reports to be doing well overall.      Compliant with routine OB care appointments? Yes.     Have you completed your 1st trimester labs? Yes. Hep B non-immune. Had Hep B shot 2/12/25 will go back next month for second dose.      If you had NIPS with MFM, do you have a order for MSAFP? Completed. Screen negative.      Have you seen MFM and do you have your detailed US scheduled? Yes. Patient has anatomy scan scheduled next week 3/6.     Pregnancy Education: Have you had a chance to review the classes/hospital tour offered and registered? Patient interested in classes and hospital tour. Registration information sent via [employer].

## 2025-03-04 PROBLEM — R10.10 UPPER ABDOMINAL PAIN: Status: RESOLVED | Noted: 2024-04-16 | Resolved: 2025-03-04

## 2025-03-05 PROBLEM — Z3A.20 20 WEEKS GESTATION OF PREGNANCY: Status: ACTIVE | Noted: 2025-02-02

## 2025-03-05 NOTE — PROGRESS NOTES
Name: Krystal Barahona      : 1990      MRN: [id number]  Encounter Provider: JOSH James  Encounter Date: 3/6/2025   Encounter department: [facility]  :  Assessment & Plan  15 weeks gestation of pregnancy  Continue prenatal vitamin and low-dose aspirin daily  Follow-up with  center scheduled 3/11/25  Encouraged to complete early 1 hour glucose  Traveling to [place]-reviewed increasing hydration, getting out of the car and ambulating frequently and compression stockings  Common discomforts of pregnancy and precautions reviewed.  Signs and symptoms to report reviewed.         Primigravida of advanced maternal age in second trimester  Follow-up with  center scheduled 3/11/25         Rh negative state in antepartum period, second trimester  Bleeding precautions reviewed.  Reviewed recommendation for RhoGAM         Maternal varicella, non-immune  Encouraged varivax PP          Not immune to rubella  Rubella equivocal, consider MMR vaccine postpartum         Nonimmune to hepatitis B virus  Recommend vaccine booster during pregnancy            RTO 4 weeks  History of Present Illness   HPI  Krystal Barahona is a 34 y.o. female who presents PN visit     Denies loss of fluid, vaginal bleeding and abdominal pain.  Confirms fetal movement, flutters. tolerating prenatal vitamin, iron and low-dose aspirin well.  AFP resulted screen negative, reviewed with patient.  Has not completed early 1 hour glucose.      History obtained from: patient    Review of Systems   Constitutional:  Negative for chills and fever.   Respiratory: Negative.     Cardiovascular: Negative.    Genitourinary: Negative.      Medical History Reviewed by provider this encounter:  Allergies  Meds     .     Objective   LMP 10/25/2024 (Approximate)      Physical Exam  Constitutional:       Appearance: Normal appearance.   Neurological:      Mental Status: She is alert and oriented to person,  place, and time.   Psychiatric:         Mood and Affect: Mood normal.         Behavior: Behavior normal.

## 2025-03-05 NOTE — ASSESSMENT & PLAN NOTE
Continue prenatal vitamin and low-dose aspirin daily  Follow-up with  center scheduled 3/11/25  Encouraged to complete early 1 hour glucose  Traveling to [place]-reviewed increasing hydration, getting out of the car and ambulating frequently and compression stockings  Common discomforts of pregnancy and precautions reviewed.  Signs and symptoms to report reviewed.

## 2025-03-06 ENCOUNTER — ROUTINE PRENATAL (OUTPATIENT)
Dept: PERINATAL CARE | Facility: CLINIC | Age: 35
End: 2025-03-06
Payer: COMMERCIAL

## 2025-03-06 ENCOUNTER — ROUTINE PRENATAL (OUTPATIENT)
Dept: OBGYN CLINIC | Facility: MEDICAL CENTER | Age: 35
End: 2025-03-06

## 2025-03-06 VITALS
DIASTOLIC BLOOD PRESSURE: 64 MMHG | WEIGHT: 136 LBS | BODY MASS INDEX: 20.61 KG/M2 | HEART RATE: 88 BPM | HEIGHT: 68 IN | SYSTOLIC BLOOD PRESSURE: 122 MMHG

## 2025-03-06 VITALS — DIASTOLIC BLOOD PRESSURE: 74 MMHG | SYSTOLIC BLOOD PRESSURE: 118 MMHG | WEIGHT: 135.8 LBS | BODY MASS INDEX: 20.65 KG/M2

## 2025-03-06 DIAGNOSIS — Z67.91 RH NEGATIVE STATE IN ANTEPARTUM PERIOD, SECOND TRIMESTER: ICD-10-CM

## 2025-03-06 DIAGNOSIS — O26.892 RH NEGATIVE STATE IN ANTEPARTUM PERIOD, SECOND TRIMESTER: ICD-10-CM

## 2025-03-06 DIAGNOSIS — O09.512 PRIMIGRAVIDA OF ADVANCED MATERNAL AGE IN SECOND TRIMESTER: ICD-10-CM

## 2025-03-06 DIAGNOSIS — Z36.86 ENCOUNTER FOR ANTENATAL SCREENING FOR CERVICAL LENGTH: ICD-10-CM

## 2025-03-06 DIAGNOSIS — O09.899 MATERNAL VARICELLA, NON-IMMUNE: ICD-10-CM

## 2025-03-06 DIAGNOSIS — Z3A.15 15 WEEKS GESTATION OF PREGNANCY: ICD-10-CM

## 2025-03-06 DIAGNOSIS — Z36.3 ENCOUNTER FOR ANTENATAL SCREENING FOR MALFORMATION USING ULTRASOUND: ICD-10-CM

## 2025-03-06 DIAGNOSIS — Z78.9 NONIMMUNE TO HEPATITIS B VIRUS: ICD-10-CM

## 2025-03-06 DIAGNOSIS — Z28.39 MATERNAL VARICELLA, NON-IMMUNE: ICD-10-CM

## 2025-03-06 DIAGNOSIS — O09.512 PRIMIGRAVIDA OF ADVANCED MATERNAL AGE IN SECOND TRIMESTER: Primary | ICD-10-CM

## 2025-03-06 DIAGNOSIS — Z3A.20 20 WEEKS GESTATION OF PREGNANCY: Primary | ICD-10-CM

## 2025-03-06 DIAGNOSIS — Z78.9 NOT IMMUNE TO RUBELLA: ICD-10-CM

## 2025-03-06 PROCEDURE — 76817 TRANSVAGINAL US OBSTETRIC: CPT | Performed by: STUDENT IN AN ORGANIZED HEALTH CARE EDUCATION/TRAINING PROGRAM

## 2025-03-06 PROCEDURE — 76811 OB US DETAILED SNGL FETUS: CPT | Performed by: STUDENT IN AN ORGANIZED HEALTH CARE EDUCATION/TRAINING PROGRAM

## 2025-03-06 PROCEDURE — 99213 OFFICE O/P EST LOW 20 MIN: CPT | Performed by: STUDENT IN AN ORGANIZED HEALTH CARE EDUCATION/TRAINING PROGRAM

## 2025-03-06 PROCEDURE — PNV: Performed by: NURSE PRACTITIONER

## 2025-03-06 NOTE — PROGRESS NOTES
Ultrasound Probe Disinfection    A transvaginal ultrasound was performed.   Prior to use, disinfection was performed with High Level Disinfection Process ([employer]eline[employer]).  Probe serial number [id number] was used.    Caitie Julien  03/06/25  1:58 PM

## 2025-03-06 NOTE — PROGRESS NOTES
This patient received  care under my supervision on 25 at 20w1d gestational age at [facility].  The note is contained in the ultrasound report located under OB Procedures tab in Epic.  Please call our office at [phone] with questions.  -Petrona Franco MD

## 2025-03-27 ENCOUNTER — APPOINTMENT (OUTPATIENT)
Dept: LAB | Facility: CLINIC | Age: 35
End: 2025-03-27
Payer: COMMERCIAL

## 2025-03-27 DIAGNOSIS — O09.511 PRIMIGRAVIDA OF ADVANCED MATERNAL AGE IN FIRST TRIMESTER: ICD-10-CM

## 2025-03-27 DIAGNOSIS — Z3A.13 13 WEEKS GESTATION OF PREGNANCY: ICD-10-CM

## 2025-03-27 LAB — GLUCOSE 1H P 50 G GLC PO SERPL-MCNC: 71 MG/DL (ref 70–134)

## 2025-03-27 PROCEDURE — 82950 GLUCOSE TEST: CPT

## 2025-03-27 PROCEDURE — 36415 COLL VENOUS BLD VENIPUNCTURE: CPT

## 2025-03-31 ENCOUNTER — TELEPHONE (OUTPATIENT)
Age: 35
End: 2025-03-31

## 2025-03-31 NOTE — TELEPHONE ENCOUNTER
Pt called and asking if she can be rescheduled to a sooner appt. No available appt till 05/19, please follow up  
Who called:STAFF     Is the patient Pregnant ? Yes  If so, How many weeks? 23wks    Reason for the Call: Reschedule 30wk appt.    Action Taken: Called patient and rescheduled appt.      Outcome/Plan/ Recommendations:  Rescheduled 30 wk appt on 5/16.  
cone shaped occiput

## 2025-04-01 NOTE — ASSESSMENT & PLAN NOTE
A- negative  Bleeding precautions reviewed   Type and screen ordered.  Will need RhoGAM at next visit

## 2025-04-01 NOTE — ASSESSMENT & PLAN NOTE
Continue prenatal vitamin daily  Follow-up with  center scheduled 25  28-week labs ordered.  Patient is Rh- will need RhoGAM  Common discomforts of pregnancy and precautions including  labor reviewed.  Signs and symptoms to report reviewed.

## 2025-04-02 ENCOUNTER — ROUTINE PRENATAL (OUTPATIENT)
Dept: OBGYN CLINIC | Facility: MEDICAL CENTER | Age: 35
End: 2025-04-02

## 2025-04-02 VITALS — BODY MASS INDEX: 21.44 KG/M2 | SYSTOLIC BLOOD PRESSURE: 112 MMHG | DIASTOLIC BLOOD PRESSURE: 62 MMHG | WEIGHT: 141 LBS

## 2025-04-02 DIAGNOSIS — Z78.9 NOT IMMUNE TO RUBELLA: ICD-10-CM

## 2025-04-02 DIAGNOSIS — Z67.91 RH NEGATIVE STATE IN ANTEPARTUM PERIOD, SECOND TRIMESTER: ICD-10-CM

## 2025-04-02 DIAGNOSIS — Z3A.24 24 WEEKS GESTATION OF PREGNANCY: ICD-10-CM

## 2025-04-02 DIAGNOSIS — O26.892 RH NEGATIVE STATE IN ANTEPARTUM PERIOD, SECOND TRIMESTER: ICD-10-CM

## 2025-04-02 DIAGNOSIS — O09.512 PRIMIGRAVIDA OF ADVANCED MATERNAL AGE IN SECOND TRIMESTER: Primary | ICD-10-CM

## 2025-04-02 PROCEDURE — PNV: Performed by: NURSE PRACTITIONER

## 2025-05-02 ENCOUNTER — ROUTINE PRENATAL (OUTPATIENT)
Dept: OBGYN CLINIC | Facility: MEDICAL CENTER | Age: 35
End: 2025-05-02
Payer: COMMERCIAL

## 2025-05-02 VITALS — DIASTOLIC BLOOD PRESSURE: 80 MMHG | SYSTOLIC BLOOD PRESSURE: 126 MMHG | BODY MASS INDEX: 22.2 KG/M2 | WEIGHT: 146 LBS

## 2025-05-02 DIAGNOSIS — Z23 ENCOUNTER FOR IMMUNIZATION: ICD-10-CM

## 2025-05-02 DIAGNOSIS — Z67.91 RH NEGATIVE STATE IN ANTEPARTUM PERIOD, SECOND TRIMESTER: ICD-10-CM

## 2025-05-02 DIAGNOSIS — O26.892 RH NEGATIVE STATE IN ANTEPARTUM PERIOD, SECOND TRIMESTER: ICD-10-CM

## 2025-05-02 DIAGNOSIS — Z3A.28 28 WEEKS GESTATION OF PREGNANCY: Primary | ICD-10-CM

## 2025-05-02 DIAGNOSIS — O09.512 PRIMIGRAVIDA OF ADVANCED MATERNAL AGE IN SECOND TRIMESTER: ICD-10-CM

## 2025-05-02 DIAGNOSIS — Z78.9 NOT IMMUNE TO RUBELLA: ICD-10-CM

## 2025-05-02 PROCEDURE — 90471 IMMUNIZATION ADMIN: CPT | Performed by: OBSTETRICS & GYNECOLOGY

## 2025-05-02 PROCEDURE — 96372 THER/PROPH/DIAG INJ SC/IM: CPT | Performed by: OBSTETRICS & GYNECOLOGY

## 2025-05-02 PROCEDURE — 90715 TDAP VACCINE 7 YRS/> IM: CPT | Performed by: OBSTETRICS & GYNECOLOGY

## 2025-05-02 PROCEDURE — PNV: Performed by: OBSTETRICS & GYNECOLOGY

## 2025-05-02 NOTE — PROGRESS NOTES
Routine Prenatal Visit  [facility] [address], [place], [place]      OB/GYN Prenatal Visit    ASSESSMENT / PLAN:  1. 28 weeks gestation of pregnancy  Assessment & Plan:  Labs not completed yet recommend to do asap     Last growth -   Next scan - 25  2. Not immune to rubella  Assessment & Plan:  Vaccine pp   3. Primigravida of advanced maternal age in second trimester  4. Rh negative state in antepartum period, second trimester  Assessment & Plan:  Rhogam today   Orders:  -     Rho(D) immune globulin (RHOGAM ULTRA-FILTERED PLUS) IM injection 300 mcg  5. Encounter for immunization  -     Tdap Vaccine greater than or equal to 8yo        SUBJECTIVE:  Krystal Barahona is a 35 y.o.  at 28 here for prenatal visit.  She has no obstetric complaints and denies pelvic pain, cramping/contractions, vaginal bleeding, loss of fluid, or decreased fetal movement.       The following portions of the patient's history were reviewed and updated as appropriate: allergies, current medications, past family history, past medical history, obstetric history, gynecologic history, past social history, past surgical history and problem list.      Objective:  /80   Wt 66.2 kg (146 lb)   LMP 10/25/2024 (Approximate)   BMI 22.20 kg/m²   Pregravid Weight/BMI: 61.2 kg (135 lb) (BMI 20.53)  Current Weight: 66.2 kg (146 lb)   Total Weight Gain: 4.99 kg (11 lb)   Pre- Vitals      Flowsheet Row Most Recent Value   Prenatal Assessment    Fetal Heart Rate 156   Movement Present   Prenatal Vitals    Blood Pressure 126/80   Weight - Scale 66.2 kg (146 lb)   Urine Albumin/Glucose    Dilation/Effacement/Station    Vaginal Drainage    Draining Fluid No   Edema    LLE Edema None   RLE Edema None               Physical Exam:    General: Well appearing, no distress  Respiratory: Unlabored breathing  Cardiovascular: Regular rate.  Abdomen: Soft, gravid, nontender  Fundal Height: Appropriate for gestational  age.  Extremities: Warm and well perfused.  Non tender.      Jarek Beckwith MD

## 2025-05-09 ENCOUNTER — TELEPHONE (OUTPATIENT)
Dept: OBGYN CLINIC | Facility: MEDICAL CENTER | Age: 35
End: 2025-05-09

## 2025-05-09 NOTE — TELEPHONE ENCOUNTER
Who called:STAFF     Is the patient Pregnant ? yes  If so, How many weeks?  28 wks    Reason for the Call:  Reschedule patients 07/08 appointment    Action Taken: LVM    Outcome/Plan/ Recommendations:  Reschedule 07/08 appointment

## 2025-05-16 ENCOUNTER — APPOINTMENT (OUTPATIENT)
Dept: LAB | Facility: MEDICAL CENTER | Age: 35
End: 2025-05-16
Payer: COMMERCIAL

## 2025-05-16 ENCOUNTER — ROUTINE PRENATAL (OUTPATIENT)
Dept: OBGYN CLINIC | Facility: MEDICAL CENTER | Age: 35
End: 2025-05-16

## 2025-05-16 VITALS — SYSTOLIC BLOOD PRESSURE: 110 MMHG | WEIGHT: 146.6 LBS | BODY MASS INDEX: 22.29 KG/M2 | DIASTOLIC BLOOD PRESSURE: 70 MMHG

## 2025-05-16 DIAGNOSIS — Z67.91 RH NEGATIVE STATE IN ANTEPARTUM PERIOD, SECOND TRIMESTER: Primary | ICD-10-CM

## 2025-05-16 DIAGNOSIS — Z67.91 RH NEGATIVE STATE IN ANTEPARTUM PERIOD, SECOND TRIMESTER: ICD-10-CM

## 2025-05-16 DIAGNOSIS — Z3A.24 24 WEEKS GESTATION OF PREGNANCY: ICD-10-CM

## 2025-05-16 DIAGNOSIS — Z3A.30 30 WEEKS GESTATION OF PREGNANCY: ICD-10-CM

## 2025-05-16 DIAGNOSIS — O09.512 PRIMIGRAVIDA OF ADVANCED MATERNAL AGE IN SECOND TRIMESTER: ICD-10-CM

## 2025-05-16 DIAGNOSIS — O26.892 RH NEGATIVE STATE IN ANTEPARTUM PERIOD, SECOND TRIMESTER: Primary | ICD-10-CM

## 2025-05-16 DIAGNOSIS — O26.892 RH NEGATIVE STATE IN ANTEPARTUM PERIOD, SECOND TRIMESTER: ICD-10-CM

## 2025-05-16 LAB
BASOPHILS # BLD AUTO: 0.02 THOUSANDS/ÂΜL (ref 0–0.1)
BASOPHILS NFR BLD AUTO: 0 % (ref 0–1)
EOSINOPHIL # BLD AUTO: 0.18 THOUSAND/ÂΜL (ref 0–0.61)
EOSINOPHIL NFR BLD AUTO: 2 % (ref 0–6)
ERYTHROCYTE [DISTWIDTH] IN BLOOD BY AUTOMATED COUNT: 13.1 % (ref 11.6–15.1)
HCT VFR BLD AUTO: 34.1 % (ref 34.8–46.1)
HGB BLD-MCNC: 10.9 G/DL (ref 11.5–15.4)
IMM GRANULOCYTES # BLD AUTO: 0.15 THOUSAND/UL (ref 0–0.2)
IMM GRANULOCYTES NFR BLD AUTO: 1 % (ref 0–2)
LYMPHOCYTES # BLD AUTO: 1.41 THOUSANDS/ÂΜL (ref 0.6–4.47)
LYMPHOCYTES NFR BLD AUTO: 12 % (ref 14–44)
MCH RBC QN AUTO: 31.7 PG (ref 26.8–34.3)
MCHC RBC AUTO-ENTMCNC: 32 G/DL (ref 31.4–37.4)
MCV RBC AUTO: 99 FL (ref 82–98)
MONOCYTES # BLD AUTO: 0.96 THOUSAND/ÂΜL (ref 0.17–1.22)
MONOCYTES NFR BLD AUTO: 8 % (ref 4–12)
NEUTROPHILS # BLD AUTO: 8.88 THOUSANDS/ÂΜL (ref 1.85–7.62)
NEUTS SEG NFR BLD AUTO: 77 % (ref 43–75)
NRBC BLD AUTO-RTO: 0 /100 WBCS
PLATELET # BLD AUTO: 221 THOUSANDS/UL (ref 149–390)
PMV BLD AUTO: 11.8 FL (ref 8.9–12.7)
RBC # BLD AUTO: 3.44 MILLION/UL (ref 3.81–5.12)
WBC # BLD AUTO: 11.6 THOUSAND/UL (ref 4.31–10.16)

## 2025-05-16 PROCEDURE — PNV: Performed by: STUDENT IN AN ORGANIZED HEALTH CARE EDUCATION/TRAINING PROGRAM

## 2025-05-16 PROCEDURE — 36415 COLL VENOUS BLD VENIPUNCTURE: CPT

## 2025-05-16 PROCEDURE — 86780 TREPONEMA PALLIDUM: CPT

## 2025-05-16 PROCEDURE — 85025 COMPLETE CBC W/AUTO DIFF WBC: CPT

## 2025-05-17 LAB — TREPONEMA PALLIDUM IGG+IGM AB [PRESENCE] IN SERUM OR PLASMA BY IMMUNOASSAY: NORMAL

## 2025-05-19 PROBLEM — Z3A.30 30 WEEKS GESTATION OF PREGNANCY: Status: ACTIVE | Noted: 2025-02-02

## 2025-05-20 NOTE — PROGRESS NOTES
Name: Krystal Barahona      : 1990      MRN: [id number]  Encounter Provider: Klarissa Sky MD  Encounter Date: 2025   Encounter [facility]: [facility]  :  Assessment & Plan  Rh negative state in antepartum period, second trimester         Primigravida of advanced maternal age in second trimester         30 weeks gestation of pregnancy             History of Present Illness   The patient denies leakage of fluid, pelvic pain, or vaginal bleeding.  Reports fetal movement.        Krystal Barahona is a 35 y.o. female who presents for routine care at 30w5d    History obtained from: patient    Review of Systems   Constitutional:  Negative for chills and fever.   Respiratory:  Negative for cough and shortness of breath.    Cardiovascular:  Negative for chest pain and leg swelling.   Gastrointestinal:  Negative for abdominal pain, nausea and vomiting.   Genitourinary:  Negative for dysuria, frequency and urgency.   Neurological:  Negative for dizziness, light-headedness and headaches.     Medical History Reviewed by provider this encounter:     .     Objective   /70   Wt 66.5 kg (146 lb 9.6 oz)   LMP 10/25/2024 (Approximate)   BMI 22.29 kg/m²      Physical Exam  Constitutional:       Appearance: Normal appearance.   HENT:      Head: Normocephalic and atraumatic.     Cardiovascular:      Rate and Rhythm: Normal rate.   Pulmonary:      Effort: Pulmonary effort is normal.     Skin:     General: Skin is warm.     Neurological:      General: No focal deficit present.      Mental Status: She is alert.     Psychiatric:         Mood and Affect: Mood normal.

## 2025-05-21 ENCOUNTER — APPOINTMENT (OUTPATIENT)
Dept: LAB | Facility: CLINIC | Age: 35
End: 2025-05-21
Payer: COMMERCIAL

## 2025-05-21 LAB
ABO GROUP BLD: NORMAL
BLD GP AB SCN SERPL QL: POSITIVE
BLOOD GROUP ANTIBODIES SERPL: NORMAL
GLUCOSE 1H P 50 G GLC PO SERPL-MCNC: 126 MG/DL (ref 70–134)
RH BLD: NEGATIVE
SPECIMEN EXPIRATION DATE: NORMAL

## 2025-05-21 PROCEDURE — 86901 BLOOD TYPING SEROLOGIC RH(D): CPT

## 2025-05-21 PROCEDURE — 36415 COLL VENOUS BLD VENIPUNCTURE: CPT

## 2025-05-21 PROCEDURE — 86850 RBC ANTIBODY SCREEN: CPT

## 2025-05-21 PROCEDURE — 86900 BLOOD TYPING SEROLOGIC ABO: CPT

## 2025-05-21 PROCEDURE — 82950 GLUCOSE TEST: CPT

## 2025-05-21 PROCEDURE — 86870 RBC ANTIBODY IDENTIFICATION: CPT

## 2025-05-26 ENCOUNTER — RESULTS FOLLOW-UP (OUTPATIENT)
Dept: OBGYN CLINIC | Facility: CLINIC | Age: 35
End: 2025-05-26

## 2025-05-27 ENCOUNTER — ROUTINE PRENATAL (OUTPATIENT)
Dept: OBGYN CLINIC | Facility: MEDICAL CENTER | Age: 35
End: 2025-05-27

## 2025-05-27 VITALS — BODY MASS INDEX: 23.26 KG/M2 | WEIGHT: 153 LBS

## 2025-05-27 DIAGNOSIS — Z78.9 NONIMMUNE TO HEPATITIS B VIRUS: ICD-10-CM

## 2025-05-27 DIAGNOSIS — Z28.39 MATERNAL VARICELLA, NON-IMMUNE: ICD-10-CM

## 2025-05-27 DIAGNOSIS — O26.893 RH NEGATIVE STATE IN ANTEPARTUM PERIOD, THIRD TRIMESTER: ICD-10-CM

## 2025-05-27 DIAGNOSIS — Z78.9 NOT IMMUNE TO RUBELLA: ICD-10-CM

## 2025-05-27 DIAGNOSIS — O09.899 MATERNAL VARICELLA, NON-IMMUNE: ICD-10-CM

## 2025-05-27 DIAGNOSIS — Z3A.31 31 WEEKS GESTATION OF PREGNANCY: ICD-10-CM

## 2025-05-27 DIAGNOSIS — O09.513 PRIMIGRAVIDA OF ADVANCED MATERNAL AGE IN THIRD TRIMESTER: ICD-10-CM

## 2025-05-27 DIAGNOSIS — Z86.2 HISTORY OF IRON DEFICIENCY ANEMIA: ICD-10-CM

## 2025-05-27 DIAGNOSIS — Z67.91 RH NEGATIVE STATE IN ANTEPARTUM PERIOD, THIRD TRIMESTER: ICD-10-CM

## 2025-05-27 DIAGNOSIS — Z34.93 THIRD TRIMESTER PREGNANCY: Primary | ICD-10-CM

## 2025-05-27 PROCEDURE — PNV: Performed by: OBSTETRICS & GYNECOLOGY

## 2025-05-27 NOTE — PROGRESS NOTES
Assessment  35 y.o.  at 31w6d presenting for routine prenatal visit.     Plan  Diagnoses and all orders for this visit:    Third trimester pregnancy  31 weeks gestation of pregnancy  - PTL precautions  - FKC  - Return in 2wk for PN    Maternal varicella, non-immune  Not immune to rubella  - Booster pp    Nonimmune to hepatitis B virus  - Consider booster    Primigravida of advanced maternal age in third trimester  - Follows with MFM  - Normal NIPT/AFP    Rh negative state in antepartum period, third trimester  - s/p rhogam    History of iron deficiency anemia  -     CBC and Platelet; Future  -     Ferritin; Future      ____________________________________________________________        Subjective    Krystal Barahona is a 35 y.o.  at 31w6d who presents for routine prenatal visit. She is noting inc fatigue. She denies contractions, loss of fluid, or vaginal bleeding. She feels regular fetal movements.     Pregnancy Problems:  Problem List[1]      Objective  Wt 69.4 kg (153 lb)   LMP 10/25/2024 (Approximate)   BMI 23.26 kg/m²     FHT: 140 BPM   Uterine Size: size equals dates     Physical Exam:  Physical Exam  Constitutional:       General: She is not in acute distress.     Appearance: Normal appearance. She is well-developed. She is not ill-appearing, toxic-appearing or diaphoretic.   HENT:      Head: Normocephalic and atraumatic.     Eyes:      General: No scleral icterus.        Right eye: No discharge.         Left eye: No discharge.      Conjunctiva/sclera: Conjunctivae normal.     Pulmonary:      Effort: Pulmonary effort is normal. No accessory muscle usage or respiratory distress.   Abdominal:      General: There is distension (gravid).      Tenderness: There is no abdominal tenderness. There is no guarding or rebound.     Skin:     General: Skin is warm and dry.      Coloration: Skin is not jaundiced.      Findings: No bruising, erythema or rash.     Neurological:      Mental Status: She is alert.      Psychiatric:         Mood and Affect: Mood normal.         Behavior: Behavior normal.         Thought Content: Thought content normal.         Judgment: Judgment normal.                [1]   Patient Active Problem List  Diagnosis    Palpitations    Nausea/vomiting in pregnancy    Primigravida of advanced maternal age in third trimester    Nonimmune to hepatitis B virus    Rh negative state in antepartum period, third trimester    31 weeks gestation of pregnancy    Not immune to rubella    Maternal varicella, non-immune

## 2025-05-28 PROBLEM — Z3A.32 32 WEEKS GESTATION OF PREGNANCY: Status: ACTIVE | Noted: 2025-02-02

## 2025-05-29 ENCOUNTER — ULTRASOUND (OUTPATIENT)
Dept: PERINATAL CARE | Facility: CLINIC | Age: 35
End: 2025-05-29
Payer: COMMERCIAL

## 2025-05-29 ENCOUNTER — ANCILLARY PROCEDURE (OUTPATIENT)
Dept: PERINATAL CARE | Facility: CLINIC | Age: 35
End: 2025-05-29
Attending: STUDENT IN AN ORGANIZED HEALTH CARE EDUCATION/TRAINING PROGRAM
Payer: COMMERCIAL

## 2025-05-29 VITALS
WEIGHT: 155.2 LBS | HEART RATE: 99 BPM | SYSTOLIC BLOOD PRESSURE: 118 MMHG | BODY MASS INDEX: 23.52 KG/M2 | DIASTOLIC BLOOD PRESSURE: 68 MMHG | HEIGHT: 68 IN

## 2025-05-29 DIAGNOSIS — Z3A.32 32 WEEKS GESTATION OF PREGNANCY: Primary | ICD-10-CM

## 2025-05-29 DIAGNOSIS — O09.513 PRIMIGRAVIDA OF ADVANCED MATERNAL AGE IN THIRD TRIMESTER: ICD-10-CM

## 2025-05-29 DIAGNOSIS — Z36.89 ENCOUNTER FOR ULTRASOUND TO CHECK FETAL GROWTH: ICD-10-CM

## 2025-05-29 DIAGNOSIS — Z36.2 ENCOUNTER FOR FOLLOW-UP ULTRASOUND OF FETAL ANATOMY: ICD-10-CM

## 2025-05-29 DIAGNOSIS — Z3A.32 32 WEEKS GESTATION OF PREGNANCY: ICD-10-CM

## 2025-05-29 PROCEDURE — 76816 OB US FOLLOW-UP PER FETUS: CPT | Performed by: STUDENT IN AN ORGANIZED HEALTH CARE EDUCATION/TRAINING PROGRAM

## 2025-05-29 NOTE — PROGRESS NOTES
"[facility]: Krystal Barahona was seen today for fetal growth and followup missed anatomy ultrasound. See ultrasound report under \"Imaging\" tab.     Fetal growth is within normal limits.     She can continue pregnancy with expectant management without further ultrasounds unless an additional indication or concern arises.    Please don't hesitate to contact our office with any concerns or questions.    -Petrona Franco MD  "
Negative

## 2025-06-13 ENCOUNTER — TELEPHONE (OUTPATIENT)
Dept: OBGYN CLINIC | Facility: MEDICAL CENTER | Age: 35
End: 2025-06-13

## 2025-06-13 NOTE — TELEPHONE ENCOUNTER
3RD TRIMESTER CHECK-IN CALL      Overall how are you feeling? Patient reports to be doing well. Ready to meet her baby girl!     Compliant with routine OB appointments? Yes.     Have you completed your 3rd trimester lab work? Yes. Third trimester labs WNL.     Have you reviewed the contents of 3rd trimester folder from office? Yes.                Have you decided on a pediatrician? TBD                            Questions on paperwork to go back to office? Not at this time.    Questions on the baby birth certificate forms? Not at this time.

## 2025-06-16 PROBLEM — Z3A.34 34 WEEKS GESTATION OF PREGNANCY: Status: ACTIVE | Noted: 2025-02-02

## 2025-06-16 NOTE — ASSESSMENT & PLAN NOTE
Continue prenatal vitamin daily  Continue fetal kick counts daily   vaginal/perineal massage reviewed, encouraged 1-4 times per week.  Common discomforts of pregnancy and precautions including  labor reviewed.  Signs and symptoms to report reviewed.

## 2025-06-16 NOTE — PROGRESS NOTES
Name: Krystal Barahona      : 1990      MRN: [id number]  Encounter Provider: JOSH James  Encounter Date: 2025   Encounter department: [facility]  :  Assessment & Plan  34 weeks gestation of pregnancy  Continue prenatal vitamin daily  Continue fetal kick counts daily   vaginal/perineal massage reviewed, encouraged 1-4 times per week.  Common discomforts of pregnancy and precautions including  labor reviewed.  Signs and symptoms to report reviewed.           Primigravida of advanced maternal age in third trimester  Continue low-dose aspirin until 36 gestational weeks ( 25)            Rh negative state in antepartum period, third trimester  S/P RhoGAM 25           Maternal varicella, non-immune  Encouraged varivax PP            Not immune to rubella  Offer MMR postpartum          Nonimmune to hepatitis B virus  Recommend vaccine booster during pregnancy            RTO 2 weeks GBS at next visit     History of Present Illness   HPI  Krystal Barahona is a 35 y.o. female who presents for PNV      Denies loss of fluid, vaginal bleeding and abdominal pain.  Confirms frequent fetal movement.  Doing fetal kick counts daily.  Tolerating prenatal vitamin well.    History obtained from: patient    Review of Systems   Constitutional:  Negative for chills and fever.   Respiratory: Negative.     Cardiovascular: Negative.    Genitourinary: Negative.      Medical History Reviewed by provider this encounter:  Allergies  Meds     .     Objective   /62   Wt 71.2 kg (157 lb)   LMP 10/25/2024 (Approximate)   BMI 23.87 kg/m²      Physical Exam  Constitutional:       Appearance: Normal appearance.   Pulmonary:      Effort: Pulmonary effort is normal.   Abdominal:      Palpations: Abdomen is soft.      Tenderness: There is no abdominal tenderness.     Neurological:      Mental Status: She is alert and oriented to person, place, and time.     Psychiatric:          Mood and Affect: Mood normal.         Behavior: Behavior normal.

## 2025-06-17 ENCOUNTER — ROUTINE PRENATAL (OUTPATIENT)
Dept: OBGYN CLINIC | Facility: CLINIC | Age: 35
End: 2025-06-17

## 2025-06-17 VITALS — WEIGHT: 157 LBS | DIASTOLIC BLOOD PRESSURE: 62 MMHG | BODY MASS INDEX: 23.87 KG/M2 | SYSTOLIC BLOOD PRESSURE: 102 MMHG

## 2025-06-17 DIAGNOSIS — O09.899 MATERNAL VARICELLA, NON-IMMUNE: ICD-10-CM

## 2025-06-17 DIAGNOSIS — O09.513 PRIMIGRAVIDA OF ADVANCED MATERNAL AGE IN THIRD TRIMESTER: Primary | ICD-10-CM

## 2025-06-17 DIAGNOSIS — Z28.39 MATERNAL VARICELLA, NON-IMMUNE: ICD-10-CM

## 2025-06-17 DIAGNOSIS — O26.893 RH NEGATIVE STATE IN ANTEPARTUM PERIOD, THIRD TRIMESTER: ICD-10-CM

## 2025-06-17 DIAGNOSIS — Z78.9 NONIMMUNE TO HEPATITIS B VIRUS: ICD-10-CM

## 2025-06-17 DIAGNOSIS — Z3A.34 34 WEEKS GESTATION OF PREGNANCY: ICD-10-CM

## 2025-06-17 DIAGNOSIS — Z78.9 NOT IMMUNE TO RUBELLA: ICD-10-CM

## 2025-06-17 DIAGNOSIS — Z67.91 RH NEGATIVE STATE IN ANTEPARTUM PERIOD, THIRD TRIMESTER: ICD-10-CM

## 2025-06-17 PROCEDURE — PNV: Performed by: NURSE PRACTITIONER

## 2025-06-23 ENCOUNTER — ROUTINE PRENATAL (OUTPATIENT)
Dept: OBGYN CLINIC | Facility: MEDICAL CENTER | Age: 35
End: 2025-06-23

## 2025-06-23 ENCOUNTER — CLINICAL SUPPORT (OUTPATIENT)
Age: 35
End: 2025-06-23

## 2025-06-23 VITALS — DIASTOLIC BLOOD PRESSURE: 62 MMHG | SYSTOLIC BLOOD PRESSURE: 112 MMHG | WEIGHT: 159 LBS | BODY MASS INDEX: 24.18 KG/M2

## 2025-06-23 DIAGNOSIS — Z32.2 ENCOUNTER FOR CHILDBIRTH INSTRUCTION: Primary | ICD-10-CM

## 2025-06-23 DIAGNOSIS — Z67.91 RH NEGATIVE STATE IN ANTEPARTUM PERIOD, THIRD TRIMESTER: ICD-10-CM

## 2025-06-23 DIAGNOSIS — Z34.93 THIRD TRIMESTER PREGNANCY: ICD-10-CM

## 2025-06-23 DIAGNOSIS — O09.513 PRIMIGRAVIDA OF ADVANCED MATERNAL AGE IN THIRD TRIMESTER: ICD-10-CM

## 2025-06-23 DIAGNOSIS — O26.893 RH NEGATIVE STATE IN ANTEPARTUM PERIOD, THIRD TRIMESTER: ICD-10-CM

## 2025-06-23 DIAGNOSIS — Z3A.35 35 WEEKS GESTATION OF PREGNANCY: Primary | ICD-10-CM

## 2025-06-23 PROCEDURE — PNV: Performed by: OBSTETRICS & GYNECOLOGY

## 2025-06-23 PROCEDURE — 87150 DNA/RNA AMPLIFIED PROBE: CPT | Performed by: OBSTETRICS & GYNECOLOGY

## 2025-06-23 NOTE — PROGRESS NOTES
Name: Krystal Barahona      : 1990      MRN: [id number]  Encounter Provider: Nicole Herrera MD  Encounter Date: 2025   Encounter department: [facility]  :  Assessment & Plan  Third trimester pregnancy    Orders:  •  Strep B DNA probe, amplification    Rh negative state in antepartum period, third trimester       received rhogam   Primigravida of advanced maternal age in third trimester         35 weeks gestation of pregnancy       FKC and  PTL precautions reviewed  GBS collected today - no PCN allergy   Reviewed most recent scan       History of Present Illness   HPI  Krystal Barahona is a 35 y.o. female who presents for routine  prenatal visit   No LOF or  VB no  contractions   +FM    History obtained from: patient    Review of Systems       Objective   /62   Wt 72.1 kg (159 lb)   LMP 10/25/2024 (Approximate)   BMI 24.18 kg/m²      Physical Exam  Vitals reviewed.   Constitutional:       Appearance: Normal appearance.   HENT:      Head: Normocephalic and atraumatic.      Nose: Nose normal.     Eyes:      Conjunctiva/sclera: Conjunctivae normal.     Pulmonary:      Effort: Pulmonary effort is normal.   Abdominal:      Comments: Gravid  non  tender   Genitourinary:     General: Normal vulva.     Neurological:      General: No focal deficit present.      Mental Status: She is alert and oriented to person, place, and time.     Psychiatric:         Mood and Affect: Mood normal.         Behavior: Behavior normal.

## 2025-06-25 LAB — GP B STREP DNA SPEC QL NAA+PROBE: NEGATIVE

## 2025-06-26 ENCOUNTER — NURSE TRIAGE (OUTPATIENT)
Age: 35
End: 2025-06-26

## 2025-06-26 NOTE — TELEPHONE ENCOUNTER
"REASON FOR CONVERSATION: Vaginal Discharge    SYMPTOMS: Slight increase in white vaginal discharge. Denies odor, itching, burning, abdominal pain, LOF, vaginal bleeding. Endorses fetal movement. Was told to contact office if having white vaginal discharge.     OTHER HEALTH INFORMATION: OB 36w1d     PROTOCOL DISPOSITION: Discuss with Provider and Call Back Patient (overriding Home Care)    CARE ADVICE PROVIDED: Continue to monitor symptoms and call back if having vaginal odor, itching, burning.    PRACTICE FOLLOW-UP: Routing to provider for review and any additional recommendations.       Reason for Disposition   Normal vaginal discharge in pregnancy    Answer Assessment - Initial Assessment Questions  1. DISCHARGE: \"Describe the discharge.\" (e.g., white, yellow, green, gray, foamy, cottage cheese-like)      Slight increase in white vaginal discharge  2. ODOR: \"Is there a bad odor?\"      denies  3. ONSET: \"When did the discharge begin?\"      6/25  5. ABDOMEN PAIN: \"Are you having any abdomen pain?\" If Yes, ask: \"What does it feel like?\" (e.g., crampy, dull, intermittent, constant)       denies  6. ABDOMEN PAIN SEVERITY: If present, ask: \"How bad is it?\"  (e.g., Scale 1-10; mild, moderate, or severe)      No pain  7. CAUSE: \"What do you think is causing the discharge?\"      Unsure, pregnancy  8. OTHER SYMPTOMS: \"Do you have any other symptoms?\" (e.g., fever, itching, vaginal bleeding, pain with urination)      Denies LOF, vaginal bleeding, contractions, itching, burning.  9. KARAN: \"What date are you expecting to deliver?\"       7/23/25  10. PREGNANCY: \"How many weeks pregnant are you?\"        36w1d    Protocols used: Pregnancy - Vaginal Discharge-Adult-[place]    "

## 2025-06-27 NOTE — TELEPHONE ENCOUNTER
Outgoing call to patient, informed of recommendations. Patient states that increased discharge has stopped and no additional symptoms. Will monitor for now but if discharge increases again patient will use Monistat 7 day.

## 2025-06-30 PROBLEM — Z3A.36 36 WEEKS GESTATION OF PREGNANCY: Status: ACTIVE | Noted: 2025-02-02

## 2025-06-30 NOTE — ASSESSMENT & PLAN NOTE
Continue prenatal vitamin daily  Continue fetal kick counts daily   Continue vaginal/perineal massage 24 times per week  GBS negative   Common discomforts of pregnancy and precautions reviewed.  Signs and symptoms to report reviewed.

## 2025-06-30 NOTE — PROGRESS NOTES
Name: Krystal Barahona      : 1990      MRN: [id number]  Encounter Provider: JOSH James  Encounter Date: 2025   Encounter department: [facility]  :  Assessment & Plan  36 weeks gestation of pregnancy  Continue prenatal vitamin daily  Continue fetal kick counts daily   Continue vaginal/perineal massage 24 times per week  GBS negative   Common discomforts of pregnancy and precautions reviewed.  Signs and symptoms to report reviewed.             Rh negative state in antepartum period, third trimester  S/P RhoGAM 25             Primigravida of advanced maternal age in third trimester  S/p low-dose aspirin            Maternal varicella, non-immune  Encouraged varivax PP              Not immune to rubella  Rubella equivocal  Offer MMR postpartum            Nonimmune to hepatitis B virus  Recommend vaccine booster during pregnancy              RTO 1 week     History of Present Illness   HPI  Krystal Barahona is a 35 y.o. female who presents for PNV      Denies loss of fluid, vaginal bleeding and abdominal pain.  Confirms frequent fetal movement.  Doing fetal kick counts.  Tolerating prenatal vitamin, vitamin D, folic acid and magnesium well.    History obtained from: patient    Review of Systems   Constitutional:  Negative for chills and fever.   Respiratory: Negative.     Cardiovascular: Negative.    Genitourinary: Negative.           Objective   /72   Wt 72.3 kg (159 lb 6.4 oz)   LMP 10/25/2024 (Approximate)   BMI 24.24 kg/m²      Physical Exam  Vitals reviewed.   Constitutional:       Appearance: Normal appearance.   Pulmonary:      Effort: Pulmonary effort is normal.     Neurological:      Mental Status: She is alert and oriented to person, place, and time.     Psychiatric:         Mood and Affect: Mood normal.         Behavior: Behavior normal.

## 2025-07-01 ENCOUNTER — ROUTINE PRENATAL (OUTPATIENT)
Dept: OBGYN CLINIC | Facility: CLINIC | Age: 35
End: 2025-07-01

## 2025-07-01 VITALS — DIASTOLIC BLOOD PRESSURE: 72 MMHG | SYSTOLIC BLOOD PRESSURE: 108 MMHG | BODY MASS INDEX: 24.24 KG/M2 | WEIGHT: 159.4 LBS

## 2025-07-01 DIAGNOSIS — Z3A.36 36 WEEKS GESTATION OF PREGNANCY: ICD-10-CM

## 2025-07-01 DIAGNOSIS — Z67.91 RH NEGATIVE STATE IN ANTEPARTUM PERIOD, THIRD TRIMESTER: Primary | ICD-10-CM

## 2025-07-01 DIAGNOSIS — Z28.39 MATERNAL VARICELLA, NON-IMMUNE: ICD-10-CM

## 2025-07-01 DIAGNOSIS — Z78.9 NONIMMUNE TO HEPATITIS B VIRUS: ICD-10-CM

## 2025-07-01 DIAGNOSIS — O09.899 MATERNAL VARICELLA, NON-IMMUNE: ICD-10-CM

## 2025-07-01 DIAGNOSIS — Z78.9 NOT IMMUNE TO RUBELLA: ICD-10-CM

## 2025-07-01 DIAGNOSIS — O26.893 RH NEGATIVE STATE IN ANTEPARTUM PERIOD, THIRD TRIMESTER: Primary | ICD-10-CM

## 2025-07-01 DIAGNOSIS — O09.513 PRIMIGRAVIDA OF ADVANCED MATERNAL AGE IN THIRD TRIMESTER: ICD-10-CM

## 2025-07-01 PROCEDURE — PNV: Performed by: NURSE PRACTITIONER

## 2025-07-02 ENCOUNTER — CLINICAL SUPPORT (OUTPATIENT)
Age: 35
End: 2025-07-02

## 2025-07-02 DIAGNOSIS — Z32.2 ENCOUNTER FOR CHILDBIRTH INSTRUCTION: Primary | ICD-10-CM

## 2025-07-09 ENCOUNTER — CLINICAL SUPPORT (OUTPATIENT)
Age: 35
End: 2025-07-09

## 2025-07-09 DIAGNOSIS — Z32.2 ENCOUNTER FOR CHILDBIRTH INSTRUCTION: Primary | ICD-10-CM

## 2025-07-09 PROBLEM — Z3A.38 38 WEEKS GESTATION OF PREGNANCY: Status: ACTIVE | Noted: 2025-02-02

## 2025-07-10 ENCOUNTER — ROUTINE PRENATAL (OUTPATIENT)
Dept: OBGYN CLINIC | Facility: MEDICAL CENTER | Age: 35
End: 2025-07-10

## 2025-07-10 VITALS — SYSTOLIC BLOOD PRESSURE: 110 MMHG | BODY MASS INDEX: 24.18 KG/M2 | DIASTOLIC BLOOD PRESSURE: 80 MMHG | WEIGHT: 159 LBS

## 2025-07-10 DIAGNOSIS — Z3A.38 38 WEEKS GESTATION OF PREGNANCY: ICD-10-CM

## 2025-07-10 DIAGNOSIS — Z34.93 THIRD TRIMESTER PREGNANCY: ICD-10-CM

## 2025-07-10 DIAGNOSIS — Z28.39 MATERNAL VARICELLA, NON-IMMUNE: ICD-10-CM

## 2025-07-10 DIAGNOSIS — O09.899 MATERNAL VARICELLA, NON-IMMUNE: ICD-10-CM

## 2025-07-10 DIAGNOSIS — O09.513 PRIMIGRAVIDA OF ADVANCED MATERNAL AGE IN THIRD TRIMESTER: ICD-10-CM

## 2025-07-10 DIAGNOSIS — Z78.9 NOT IMMUNE TO RUBELLA: ICD-10-CM

## 2025-07-10 DIAGNOSIS — Z67.91 RH NEGATIVE STATE IN ANTEPARTUM PERIOD, THIRD TRIMESTER: Primary | ICD-10-CM

## 2025-07-10 DIAGNOSIS — Z78.9 NONIMMUNE TO HEPATITIS B VIRUS: ICD-10-CM

## 2025-07-10 DIAGNOSIS — O26.893 RH NEGATIVE STATE IN ANTEPARTUM PERIOD, THIRD TRIMESTER: Primary | ICD-10-CM

## 2025-07-10 PROCEDURE — PNV: Performed by: ADVANCED PRACTICE MIDWIFE

## 2025-07-10 NOTE — ASSESSMENT & PLAN NOTE
Reviewed signs and symptoms labor, [facility]  Birth plan- reviewed   GBS negative  NIPS/AFP-low risk  Desires eIOL labor- will message nurse navigator. Reviewed induction process, risk/benefit  Next visit 1 week

## 2025-07-10 NOTE — PROGRESS NOTES
Name: Krystal Barahona      : 1990      MRN: [id number]  Encounter Provider: Sarahy Madrid CNM  Encounter Date: 7/10/2025   Encounter department: [facility]  :  Assessment & Plan  Rh negative state in antepartum period, third trimester  S/P RhoGAM 25  Evaluate after delivery               Primigravida of advanced maternal age in third trimester  S/p low-dose aspirin              Not immune to rubella  Rubella equivocal  Offer MMR postpartum              Nonimmune to hepatitis B virus  Recommend vaccine booster during pregnancy                Third trimester pregnancy         Maternal varicella, non-immune  Encouraged varivax PP                38 weeks gestation of pregnancy  Reviewed signs and symptoms labor, C  Birth plan- reviewed   GBS negative  NIPS/AFP-low risk  Desires eIOL labor- will message nurse navigator. Reviewed induction process, risk/benefit  Next visit 1 week               History of Present Illness   Krystal Barahona is a 35 y.o.  female who presents for routine prenatal care at 38w1d.  Pregnancy ROS: no leakage of fluid, pelvic pain, or vaginal bleeding.  good fetal movement.    Objective:  /80   Wt 72.1 kg (159 lb)   LMP 10/25/2024 (Approximate)   BMI 24.18 kg/m²   Pregravid Weight/BMI: 61.2 kg (135 lb) (BMI 20.53)  Current Weight: 72.1 kg (159 lb)   Total Weight Gain: 10.9 kg (24 lb)   Pre- Vitals    Flowsheet Row Most Recent Value   Prenatal Assessment    Fetal Heart Rate 149   Movement Present   Prenatal Vitals    Blood Pressure 110/80   Weight - Scale 72.1 kg (159 lb)   Urine Albumin/Glucose    Dilation/Effacement/Station    Vaginal Drainage    Draining Fluid No   Edema    LLE Edema None   RLE Edema None   Facial Edema None            Krystal Barahona is a 35 y.o. female who presents for prenatal visit  History obtained from: patient    Review of Systems   Constitutional:  Negative for chills and fever.   Respiratory:  Negative for  cough and shortness of breath.    Cardiovascular:  Negative for chest pain and palpitations.   Genitourinary:  Negative for difficulty urinating and vaginal bleeding.   Psychiatric/Behavioral:  The patient is not nervous/anxious.    Medical History Reviewed by provider this encounter:     .  Medications Ordered Prior to Encounter[1]      Objective   LMP 10/25/2024 (Approximate)      Physical Exam  Vitals reviewed.   Constitutional:       Appearance: Normal appearance.   Pulmonary:      Effort: Pulmonary effort is normal.   Abdominal:      Comments: Gravid uterus     Neurological:      Mental Status: She is alert and oriented to person, place, and time.     Psychiatric:         Mood and Affect: Mood normal.         Behavior: Behavior normal.            [1]   Current Outpatient Medications on File Prior to Visit   Medication Sig Dispense Refill    cholecalciferol (VITAMIN D3) 400 units tablet Take 400 Units by mouth in the morning.      folic acid (FOLVITE) 800 MCG tablet Take 800 mcg by mouth in the morning.      magnesium 30 MG tablet Take 400 mg by mouth in the morning and 400 mg in the evening.      Prenatal MV-Min-Fe Fum-FA-DHA (PRENATAL 1 PO) Take by mouth       No current facility-administered medications on file prior to visit.

## 2025-07-11 NOTE — ASSESSMENT & PLAN NOTE
Continue prenatal vitamin daily  Continue fetal kick counts daily   Continue vaginal/perineal massage 1-4 times per week  GBS negative   Common discomforts of pregnancy and precautions reviewed.  Signs and symptoms to report reviewed.

## 2025-07-11 NOTE — PROGRESS NOTES
Name: Krystal Barahona      : 1990      MRN: [id number]  Encounter Provider: JOSH James  Encounter Date: 2025   Encounter department: [facility]  :  Assessment & Plan  38 weeks gestation of pregnancy  Continue prenatal vitamin daily  Continue fetal kick counts daily   Continue vaginal/perineal massage 1-4 times per week  GBS negative   Common discomforts of pregnancy and precautions reviewed.  Signs and symptoms to report reviewed.               Primigravida of advanced maternal age in third trimester  S/p low-dose aspirin              Rh negative state in antepartum period, third trimester  S/P RhoGAM 25               Maternal varicella, non-immune  Encouraged varivax PP                Not immune to rubella  Rubella equivocal  Offer MMR postpartum              Nonimmune to hepatitis B virus  Recommend vaccine booster during pregnancy                RTO 1 week    History of Present Illness   HPI  Krystal Barahona is a 35 y.o. female who presents for PNV      Denies loss of fluid, vaginal bleedin and abdominal pain.  Confirms frequent fetal movement.  Doing fetal kick counts.  Tolerating prenatal vitamin well.    SVE FT/60/-3, tolerated well    History obtained from: patient    Review of Systems   Constitutional:  Negative for chills.   Respiratory: Negative.     Cardiovascular: Negative.    Genitourinary: Negative.      Medical History Reviewed by provider this encounter:  Allergies  Meds     .     Objective   /62   Wt 71.6 kg (157 lb 12.8 oz)   LMP 10/25/2024 (Approximate)   BMI 23.99 kg/m²      Physical Exam  Vitals reviewed.   Constitutional:       Appearance: Normal appearance.   Pulmonary:      Effort: Pulmonary effort is normal.   Abdominal:      Palpations: Abdomen is soft.      Tenderness: There is no abdominal tenderness.      Comments: gravid     Neurological:      Mental Status: She is alert and oriented to person, place, and time.      Psychiatric:         Mood and Affect: Mood normal.         Behavior: Behavior normal.

## 2025-07-14 ENCOUNTER — TELEPHONE (OUTPATIENT)
Dept: OBGYN CLINIC | Facility: MEDICAL CENTER | Age: 35
End: 2025-07-14

## 2025-07-14 ENCOUNTER — ROUTINE PRENATAL (OUTPATIENT)
Dept: OBGYN CLINIC | Facility: CLINIC | Age: 35
End: 2025-07-14

## 2025-07-14 VITALS — SYSTOLIC BLOOD PRESSURE: 112 MMHG | BODY MASS INDEX: 23.99 KG/M2 | WEIGHT: 157.8 LBS | DIASTOLIC BLOOD PRESSURE: 62 MMHG

## 2025-07-14 DIAGNOSIS — Z78.9 NOT IMMUNE TO RUBELLA: ICD-10-CM

## 2025-07-14 DIAGNOSIS — Z67.91 RH NEGATIVE STATE IN ANTEPARTUM PERIOD, THIRD TRIMESTER: ICD-10-CM

## 2025-07-14 DIAGNOSIS — Z3A.38 38 WEEKS GESTATION OF PREGNANCY: ICD-10-CM

## 2025-07-14 DIAGNOSIS — Z28.39 MATERNAL VARICELLA, NON-IMMUNE: ICD-10-CM

## 2025-07-14 DIAGNOSIS — O09.513 PRIMIGRAVIDA OF ADVANCED MATERNAL AGE IN THIRD TRIMESTER: Primary | ICD-10-CM

## 2025-07-14 DIAGNOSIS — Z78.9 NONIMMUNE TO HEPATITIS B VIRUS: ICD-10-CM

## 2025-07-14 DIAGNOSIS — O09.899 MATERNAL VARICELLA, NON-IMMUNE: ICD-10-CM

## 2025-07-14 DIAGNOSIS — O26.893 RH NEGATIVE STATE IN ANTEPARTUM PERIOD, THIRD TRIMESTER: ICD-10-CM

## 2025-07-14 PROCEDURE — PNV: Performed by: NURSE PRACTITIONER

## 2025-07-14 NOTE — TELEPHONE ENCOUNTER
Discussed dates with patient. eIOL scheduled 7/23 9 PM. Naked message sent with details.     ----- Message -----  From: Sarahy Madrid CNM  Sent: 7/10/2025  11:43 AM EDT  To: [facility]  Subject: eIOL                                             Procedure to be scheduled (IOL or CS): eIOL    KARAN: Estimated Date of Delivery: 7/23/25     Indication for delivery: Elective at term    Physician preference if available: desires to deliver on 7/23 and would need cervical ripening on 7/22- she is aware that this will only happen if we are on call. Wants to wait until 7/20 regardless for induction    Cervical Exam Dilation: 0cm, Effacement:  30%, Station:  -3, and Position:  anterior    If IOL, anticipated method: cervical ripening agents or pitocin    AM or PM IOL? PM    If CS, with or without tubal: n/a

## 2025-07-18 ENCOUNTER — TELEPHONE (OUTPATIENT)
Dept: OBGYN CLINIC | Facility: MEDICAL CENTER | Age: 35
End: 2025-07-18

## 2025-07-18 ENCOUNTER — NURSE TRIAGE (OUTPATIENT)
Age: 35
End: 2025-07-18

## 2025-07-18 NOTE — TELEPHONE ENCOUNTER
Regarding: impact  on Stomach  ----- Message from Kacy ARCEO sent at 7/18/2025  8:33 AM EDT -----  40 weeks  was hit  by  basketball  yesterday  in  the stomach  but  indicates  she  is  fine

## 2025-07-18 NOTE — TELEPHONE ENCOUNTER
"REASON FOR CONVERSATION: Abdominal Injury    SYMPTOMS: Patient tapped in right side of abdomen by basketball.     OTHER HEALTH INFORMATION: OB 39w2d  was at [facility] last night and basketball flew out of machine and tapped patient in right side of abdomen. Patient denies hard it, states it was mild. Denies abdominal pain, bruising, LOF, vaginal bleeding, contractions. Endorses fetal movement.     PROTOCOL DISPOSITION: Home Care    CARE ADVICE PROVIDED: Continue to monitor at this time and CALL BACK IF:   * You have more questions  * You have abdominal pain  * You have leakage of fluid or vaginal bleeding  * You notice decrease in fetal movement     PRACTICE FOLLOW-UP: Routing to provider for review if any additional or different recommendations.       Reason for Disposition   Brief abdominal pain and no symptoms now    Answer Assessment - Initial Assessment Questions  1. MECHANISM: \"How did the injury happen?\"       Basketball hit abdomen at [facility]. Tapped on right side of abdomen.   3. ONSET: \"When did the injury happen?\" (e.g., minutes or hours ago)  \"Are you in danger right now?\"      Occurred around 8:30pm on .   4. LOCATION: \"What part of the stomach (belly) is injured?\" (e.g., above or below the belly button, right or left)      denies  5. APPEARANCE of INJURY: \"What does the injury look like?\"      No injury   6. PAIN: \"Is there any pain?\" If Yes, ask:  \"How bad is the pain?\"  (Scale 0-10; mild, moderate, or severe)      Denies pain  7. SIZE: For cuts, bruises, or swelling, ask: \"How large is it?\" (e.g., inches or centimeters)      No bruises, cuts   8. OTHER SYMPTOMS: \"Do you have any other symptoms?\" (e.g., contractions, leaking fluid, vaginal bleeding)       Denies all   10. KARAN: \"What date are you expecting to deliver?\"        25  11. FETAL MOVEMENT: \"Has the baby's movement decreased or changed significantly from normal?\"        States baby is very active    Protocols used: " Pregnancy - Abdomen Injury-Adult-[place]

## 2025-07-22 ENCOUNTER — ROUTINE PRENATAL (OUTPATIENT)
Dept: OBGYN CLINIC | Facility: MEDICAL CENTER | Age: 35
End: 2025-07-22

## 2025-07-22 VITALS — BODY MASS INDEX: 24.18 KG/M2 | SYSTOLIC BLOOD PRESSURE: 118 MMHG | WEIGHT: 159 LBS | DIASTOLIC BLOOD PRESSURE: 70 MMHG

## 2025-07-22 DIAGNOSIS — O09.513 PRIMIGRAVIDA OF ADVANCED MATERNAL AGE IN THIRD TRIMESTER: ICD-10-CM

## 2025-07-22 DIAGNOSIS — Z3A.39 39 WEEKS GESTATION OF PREGNANCY: Primary | ICD-10-CM

## 2025-07-22 PROCEDURE — PNV: Performed by: OBSTETRICS & GYNECOLOGY

## 2025-07-22 NOTE — PROGRESS NOTES
Name: Krystal Barahona      : 1990      MRN: [id number]  Encounter Provider: Nicole Herrera MD  Encounter Date: 2025   Enc[facility]: [facility]  :  Assessment & Plan  Primigravida of advanced maternal age in third trimester         39 weeks gestation of pregnancy       IOL scheduled on  . @  9 pm   On exam today 3/90/-2   FKC and  labor precautions  reviewed          History of Present Illness   HPI  Krystal Barahona is a 35 y.o. female who presents for routine  prenatal care  +FM   No  LOF or  VB   No contractions  +mucous discharge    History obtained from: patient    Review of Systems       Objective   /70   Wt 72.1 kg (159 lb)   LMP 10/25/2024 (Approximate)   BMI 24.18 kg/m²      Physical Exam  Vitals reviewed.   Constitutional:       Appearance: Normal appearance.   HENT:      Head: Normocephalic and atraumatic.      Nose: Nose normal.     Eyes:      Conjunctiva/sclera: Conjunctivae normal.     Pulmonary:      Effort: Pulmonary effort is normal.   Abdominal:      Comments: Gravid non tender   3/90/-2      Neurological:      General: No focal deficit present.      Mental Status: She is alert and oriented to person, place, and time.     Psychiatric:         Mood and Affect: Mood normal.         Behavior: Behavior normal.

## 2025-07-23 ENCOUNTER — NURSE TRIAGE (OUTPATIENT)
Age: 35
End: 2025-07-23

## 2025-07-23 ENCOUNTER — APPOINTMENT (OUTPATIENT)
Dept: LABOR AND DELIVERY | Facility: HOSPITAL | Age: 35
End: 2025-07-23
Payer: COMMERCIAL

## 2025-07-23 ENCOUNTER — HOSPITAL ENCOUNTER (INPATIENT)
Facility: HOSPITAL | Age: 35
LOS: 2 days | Discharge: HOME | End: 2025-07-25
Attending: OBSTETRICS & GYNECOLOGY | Admitting: OBSTETRICS & GYNECOLOGY
Payer: COMMERCIAL

## 2025-07-23 DIAGNOSIS — O09.513 PRIMIGRAVIDA OF ADVANCED MATERNAL AGE IN THIRD TRIMESTER: ICD-10-CM

## 2025-07-23 PROBLEM — Z34.90 ENCOUNTER FOR ELECTIVE INDUCTION OF LABOR: Status: ACTIVE | Noted: 2025-07-23

## 2025-07-23 LAB
ABO GROUP BLD: NORMAL
BLD GP AB SCN SERPL QL: NEGATIVE
ERYTHROCYTE [DISTWIDTH] IN BLOOD BY AUTOMATED COUNT: 12.3 % (ref 11.6–15.1)
HCT VFR BLD AUTO: 40.4 % (ref 34.8–46.1)
HGB BLD-MCNC: 13.8 G/DL (ref 11.5–15.4)
HOLD SPECIMEN: YES
MCH RBC QN AUTO: 32.5 PG (ref 26.8–34.3)
MCHC RBC AUTO-ENTMCNC: 34.2 G/DL (ref 31.4–37.4)
MCV RBC AUTO: 95 FL (ref 82–98)
PLATELET # BLD AUTO: 201 THOUSANDS/UL (ref 149–390)
PMV BLD AUTO: 11.7 FL (ref 8.9–12.7)
RBC # BLD AUTO: 4.25 MILLION/UL (ref 3.81–5.12)
RH BLD: NEGATIVE
SPECIMEN EXPIRATION DATE: NORMAL
WBC # BLD AUTO: 13.35 THOUSAND/UL (ref 4.31–10.16)

## 2025-07-23 PROCEDURE — 86901 BLOOD TYPING SEROLOGIC RH(D): CPT

## 2025-07-23 PROCEDURE — 76815 OB US LIMITED FETUS(S): CPT | Performed by: OBSTETRICS & GYNECOLOGY

## 2025-07-23 PROCEDURE — 86850 RBC ANTIBODY SCREEN: CPT

## 2025-07-23 PROCEDURE — NC001 PR NO CHARGE: Performed by: OBSTETRICS & GYNECOLOGY

## 2025-07-23 PROCEDURE — 86900 BLOOD TYPING SEROLOGIC ABO: CPT

## 2025-07-23 PROCEDURE — 85027 COMPLETE CBC AUTOMATED: CPT

## 2025-07-23 PROCEDURE — 86780 TREPONEMA PALLIDUM: CPT

## 2025-07-23 RX ORDER — ONDANSETRON 2 MG/ML
4 INJECTION INTRAMUSCULAR; INTRAVENOUS EVERY 6 HOURS PRN
Status: DISCONTINUED | OUTPATIENT
Start: 2025-07-23 | End: 2025-07-25 | Stop reason: HOSPADM

## 2025-07-23 RX ORDER — BUPIVACAINE HYDROCHLORIDE 2.5 MG/ML
30 INJECTION, SOLUTION EPIDURAL; INFILTRATION; INTRACAUDAL; PERINEURAL ONCE AS NEEDED
Status: DISCONTINUED | OUTPATIENT
Start: 2025-07-23 | End: 2025-07-25 | Stop reason: HOSPADM

## 2025-07-23 RX ORDER — SODIUM CHLORIDE, SODIUM LACTATE, POTASSIUM CHLORIDE, CALCIUM CHLORIDE 600; 310; 30; 20 MG/100ML; MG/100ML; MG/100ML; MG/100ML
125 INJECTION, SOLUTION INTRAVENOUS CONTINUOUS
Status: DISCONTINUED | OUTPATIENT
Start: 2025-07-23 | End: 2025-07-25 | Stop reason: HOSPADM

## 2025-07-23 RX ORDER — OXYTOCIN/0.9 % SODIUM CHLORIDE 30/500 ML
1-30 PLASTIC BAG, INJECTION (ML) INTRAVENOUS
Status: DISCONTINUED | OUTPATIENT
Start: 2025-07-23 | End: 2025-07-25 | Stop reason: HOSPADM

## 2025-07-23 RX ADMIN — Medication 2 MILLI-UNITS/MIN: at 23:04

## 2025-07-23 RX ADMIN — SODIUM CHLORIDE, SODIUM LACTATE, POTASSIUM CHLORIDE, AND CALCIUM CHLORIDE 125 ML/HR: .6; .31; .03; .02 INJECTION, SOLUTION INTRAVENOUS at 23:04

## 2025-07-23 NOTE — TELEPHONE ENCOUNTER
"REASON FOR CONVERSATION: Pregnancy Problem    SYMPTOMS: contractions    OTHER HEALTH INFORMATION: [id number] yesterday 3/90/-2. Scheduled ripening tonight with IOL tomorrow. States has been having cramping since last night. They are every 10-15mins, lasting 10-15s, mild/moderate in severity. Notes scant blood in discharge when wiping. Denies LOF and endorses FM. ESC to provider to update.    PROTOCOL DISPOSITION: Home Care    CARE ADVICE PROVIDED: Advised spotting can be expected after cervical check. Continue to monitor ctxns. Hydrate and rest. Call with ctxns q5mins, lasting 1 minute and intensifying in nature. Patient verbalized understanding.      PRACTICE FOLLOW-UP: n/a      Reason for Disposition   First baby (primipara) with contractions > 5 minutes apart OR contractions present < 2 hours    Answer Assessment - Initial Assessment Questions  1. ONSET: \"When did the symptoms begin?\"         Last night  2. CONTRACTIONS: \"Describe the contractions that you are having.\" (e.g., duration, frequency, regularity, severity)      Every 10-15 mins, lasting less than 30s, mild  3. PREGNANCY: \"How many weeks pregnant are you?\"      40w  4. KARAN: \"What date are you expecting to deliver?\"        5. PARITY: \"Have you had a baby before?\" If Yes, ask: \"How long did the labor last?\"        6. FETAL MOVEMENT: \"Has the baby's movement decreased or changed significantly from normal?\"      denies  7. OTHER SYMPTOMS: \"Do you have any other symptoms?\" (e.g., leaking fluid from vagina, vaginal bleeding, fever, hand/facial swelling)      Spotting after SVE    Protocols used: Pregnancy - Labor-Adult-[place]    "

## 2025-07-24 ENCOUNTER — ANESTHESIA EVENT (INPATIENT)
Dept: ANESTHESIOLOGY | Facility: HOSPITAL | Age: 35
End: 2025-07-24
Payer: COMMERCIAL

## 2025-07-24 ENCOUNTER — ANESTHESIA (INPATIENT)
Dept: ANESTHESIOLOGY | Facility: HOSPITAL | Age: 35
End: 2025-07-24
Payer: COMMERCIAL

## 2025-07-24 LAB
ABO GROUP BLD: NORMAL
BASE EXCESS BLDCOA CALC-SCNC: -7.7 MMOL/L (ref 3–11)
BASE EXCESS BLDCOV CALC-SCNC: -7.3 MMOL/L (ref 1–9)
BLD GP AB SCN SERPL QL: NEGATIVE
FETAL CELL SCN BLD QL ROSETTE: NEGATIVE
HCO3 BLDCOA-SCNC: 19.2 MMOL/L (ref 17.3–27.3)
HCO3 BLDCOV-SCNC: 17.5 MMOL/L (ref 12.2–28.6)
O2 CT VFR BLDCOA CALC: 12.5 ML/DL
OXYHGB MFR BLDCOA: 60.1 %
OXYHGB MFR BLDCOV: 72.1 %
PCO2 BLDCOA: 43.7 MM[HG] (ref 30–60)
PCO2 BLDCOV: 33.5 MM HG (ref 27–43)
PH BLDCOA: 7.26 [PH] (ref 7.23–7.43)
PH BLDCOV: 7.33 [PH] (ref 7.19–7.49)
PO2 BLDCOA: 32.3 MM HG (ref 5–25)
PO2 BLDCOV: 36.2 MM HG (ref 15–45)
RH BLD: NEGATIVE
SAO2 % BLDCOV: 14.6 ML/DL
TREPONEMA PALLIDUM IGG+IGM AB [PRESENCE] IN SERUM OR PLASMA BY IMMUNOASSAY: NORMAL

## 2025-07-24 PROCEDURE — 82805 BLOOD GASES W/O2 SATURATION: CPT | Performed by: OBSTETRICS & GYNECOLOGY

## 2025-07-24 PROCEDURE — 86850 RBC ANTIBODY SCREEN: CPT

## 2025-07-24 PROCEDURE — 85461 HEMOGLOBIN FETAL: CPT

## 2025-07-24 PROCEDURE — 86901 BLOOD TYPING SEROLOGIC RH(D): CPT

## 2025-07-24 PROCEDURE — 3E0R3BZ INTRODUCTION OF ANESTHETIC AGENT INTO SPINAL CANAL, PERCUTANEOUS APPROACH: ICD-10-PCS | Performed by: PHYSICAL MEDICINE & REHABILITATION

## 2025-07-24 PROCEDURE — 86900 BLOOD TYPING SEROLOGIC ABO: CPT

## 2025-07-24 PROCEDURE — 59400 OBSTETRICAL CARE: CPT | Performed by: OBSTETRICS & GYNECOLOGY

## 2025-07-24 PROCEDURE — NC001 PR NO CHARGE: Performed by: NURSE PRACTITIONER

## 2025-07-24 RX ORDER — DOCUSATE SODIUM 100 MG/1
100 CAPSULE, LIQUID FILLED ORAL 2 TIMES DAILY
Status: DISCONTINUED | OUTPATIENT
Start: 2025-07-24 | End: 2025-07-25 | Stop reason: HOSPADM

## 2025-07-24 RX ORDER — DIPHENHYDRAMINE HYDROCHLORIDE 50 MG/ML
12.5 INJECTION, SOLUTION INTRAMUSCULAR; INTRAVENOUS EVERY 6 HOURS PRN
Status: DISCONTINUED | OUTPATIENT
Start: 2025-07-24 | End: 2025-07-25 | Stop reason: HOSPADM

## 2025-07-24 RX ORDER — ACETAMINOPHEN 325 MG/1
650 TABLET ORAL EVERY 6 HOURS
Status: DISCONTINUED | OUTPATIENT
Start: 2025-07-24 | End: 2025-07-25 | Stop reason: HOSPADM

## 2025-07-24 RX ORDER — ACETAMINOPHEN 325 MG/1
650 TABLET ORAL EVERY 6 HOURS
Status: CANCELLED
Start: 2025-07-24

## 2025-07-24 RX ORDER — CALCIUM CARBONATE 500 MG/1
1000 TABLET, CHEWABLE ORAL DAILY PRN
Status: DISCONTINUED | OUTPATIENT
Start: 2025-07-24 | End: 2025-07-25 | Stop reason: HOSPADM

## 2025-07-24 RX ORDER — SODIUM CHLORIDE, SODIUM LACTATE, POTASSIUM CHLORIDE, CALCIUM CHLORIDE 600; 310; 30; 20 MG/100ML; MG/100ML; MG/100ML; MG/100ML
125 INJECTION, SOLUTION INTRAVENOUS CONTINUOUS
Status: DISCONTINUED | OUTPATIENT
Start: 2025-07-24 | End: 2025-07-25 | Stop reason: HOSPADM

## 2025-07-24 RX ORDER — SIMETHICONE 80 MG
80 TABLET,CHEWABLE ORAL 4 TIMES DAILY PRN
Status: DISCONTINUED | OUTPATIENT
Start: 2025-07-24 | End: 2025-07-25 | Stop reason: HOSPADM

## 2025-07-24 RX ORDER — ROPIVACAINE HYDROCHLORIDE 2 MG/ML
INJECTION, SOLUTION EPIDURAL; INFILTRATION; PERINEURAL
Status: COMPLETED | OUTPATIENT
Start: 2025-07-24 | End: 2025-07-24

## 2025-07-24 RX ORDER — IBUPROFEN 600 MG/1
600 TABLET, FILM COATED ORAL EVERY 6 HOURS
Status: DISCONTINUED | OUTPATIENT
Start: 2025-07-24 | End: 2025-07-25 | Stop reason: HOSPADM

## 2025-07-24 RX ORDER — DIPHENHYDRAMINE HCL 25 MG
25 TABLET ORAL EVERY 6 HOURS PRN
Status: DISCONTINUED | OUTPATIENT
Start: 2025-07-24 | End: 2025-07-25 | Stop reason: HOSPADM

## 2025-07-24 RX ORDER — BENZOCAINE/MENTHOL 6 MG-10 MG
1 LOZENGE MUCOUS MEMBRANE DAILY PRN
Status: DISCONTINUED | OUTPATIENT
Start: 2025-07-24 | End: 2025-07-25 | Stop reason: HOSPADM

## 2025-07-24 RX ORDER — TERBUTALINE SULFATE 1 MG/ML
INJECTION SUBCUTANEOUS
Status: DISCONTINUED
Start: 2025-07-24 | End: 2025-07-24 | Stop reason: WASHOUT

## 2025-07-24 RX ORDER — OXYTOCIN/0.9 % SODIUM CHLORIDE 30/500 ML
250 PLASTIC BAG, INJECTION (ML) INTRAVENOUS ONCE
Status: DISCONTINUED | OUTPATIENT
Start: 2025-07-24 | End: 2025-07-25 | Stop reason: HOSPADM

## 2025-07-24 RX ORDER — IBUPROFEN 200 MG
600 TABLET ORAL EVERY 6 HOURS
Status: CANCELLED
Start: 2025-07-24

## 2025-07-24 RX ORDER — ONDANSETRON 2 MG/ML
4 INJECTION INTRAMUSCULAR; INTRAVENOUS EVERY 8 HOURS PRN
Status: DISCONTINUED | OUTPATIENT
Start: 2025-07-24 | End: 2025-07-25 | Stop reason: HOSPADM

## 2025-07-24 RX ADMIN — DOCUSATE SODIUM 100 MG: 100 CAPSULE, LIQUID FILLED ORAL at 17:32

## 2025-07-24 RX ADMIN — BENZOCAINE AND LEVOMENTHOL 1 APPLICATION: 200; 5 SPRAY TOPICAL at 17:35

## 2025-07-24 RX ADMIN — IBUPROFEN 600 MG: 600 TABLET, FILM COATED ORAL at 15:12

## 2025-07-24 RX ADMIN — ROPIVACAINE HYDROCHLORIDE: 2 INJECTION, SOLUTION EPIDURAL; INFILTRATION at 04:20

## 2025-07-24 RX ADMIN — ROPIVACAINE HYDROCHLORIDE 10 ML: 2 INJECTION, SOLUTION EPIDURAL; INFILTRATION at 04:22

## 2025-07-24 RX ADMIN — SODIUM CHLORIDE, SODIUM LACTATE, POTASSIUM CHLORIDE, AND CALCIUM CHLORIDE 300 ML: .6; .31; .03; .02 INJECTION, SOLUTION INTRAVENOUS at 07:19

## 2025-07-24 RX ADMIN — WITCH HAZEL 1 PAD: 500 SOLUTION RECTAL; TOPICAL at 17:36

## 2025-07-24 RX ADMIN — ROPIVACAINE HYDROCHLORIDE 10 ML/HR: 2 INJECTION, SOLUTION EPIDURAL; INFILTRATION at 04:23

## 2025-07-24 RX ADMIN — SODIUM CHLORIDE, SODIUM LACTATE, POTASSIUM CHLORIDE, AND CALCIUM CHLORIDE 125 ML/HR: .6; .31; .03; .02 INJECTION, SOLUTION INTRAVENOUS at 05:41

## 2025-07-24 RX ADMIN — RHO(D) IMMUNE GLOBULIN (HUMAN) 300 MCG: 1500 SOLUTION INTRAMUSCULAR at 20:39

## 2025-07-24 RX ADMIN — ACETAMINOPHEN 650 MG: 325 TABLET ORAL at 15:12

## 2025-07-24 RX ADMIN — SODIUM CHLORIDE, SODIUM LACTATE, POTASSIUM CHLORIDE, AND CALCIUM CHLORIDE 125 ML/HR: .6; .31; .03; .02 INJECTION, SOLUTION INTRAVENOUS at 08:21

## 2025-07-24 RX ADMIN — ACETAMINOPHEN 650 MG: 325 TABLET ORAL at 20:43

## 2025-07-24 RX ADMIN — IBUPROFEN 600 MG: 600 TABLET, FILM COATED ORAL at 20:43

## 2025-07-24 RX ADMIN — SODIUM CHLORIDE, SODIUM LACTATE, POTASSIUM CHLORIDE, AND CALCIUM CHLORIDE 125 ML/HR: .6; .31; .03; .02 INJECTION, SOLUTION INTRAVENOUS at 04:26

## 2025-07-24 NOTE — DISCHARGE INSTR - AVS FIRST PAGE
"Discharge instructions:   -Do not place anything (no partner, sex toys, tampons, douche, etc.) in your vagina for 6 weeks  -You may walk for exercise for the first 6 weeks then gradually return to your usual activities    -Please do not drive if you are taking any narcotic medications  -You may take baths or shower per your preference   -Please examine your breasts in the mirror daily and call your doctor for redness, tenderness, increased warmth, fevers, or chills  -Please call your doctor's office for temperature > 100.4*F or 38*C, worsening pain, increased bleeding (filling 2 or more maxi pads within 1 hr or less for at least 2 hrs), foul-smelling discharge/drainage, burning with urination, or symptoms of depression    For pain, you may take 650mg of Tylenol every 6 hours  For cramping, you may take 600mg of ibuprofen every 6 hours    You can alternate Tylenol and ibuprofen and take them \"around the clock\" to stay ahead of pain. For example, take 650mg of Tylenol at 9 AM, then 600mg of ibuprofen at 12 PM, then 650mg of Tylenol at 3 PM, and so forth.     Please take over the counter stool softener or laxative to ensure you do not strain when moving your bowels. You can take whatever is preferred (Miralax, Senna, Metamucil).    If you have any questions or concerns, please call your doctor.   "

## 2025-07-24 NOTE — DISCHARGE SUMMARY
Discharge Summary - OB/GYN   Name: Krystal Barahona 35 y.o. female I MRN: [id number]  Unit/Bed#: -01 I Date of Admission: 2025   Date of Service: 2025 I Hospital Day: 2    ADMISSION  [facility] of: [facility] ([facility]ortez Beckwith, [place], Jose Daniel Allen)  Admission Date: 2025   Admitting Attending: Dr. Jarek Beckwith MD  Admitting Diagnoses: Problem List[1]    DELIVERY  Delivery Method: Vaginal, Spontaneous   Delivery Date and Time: 2025 12:56 PM  Delivery Attending: Nicole Herrera    DISCHARGE  Discharge Date: 25  Discharge Attending: Dr. Jarek Beckwith MD  Discharge Diagnosis:   Same, Delivered    Clinical course: Admission to Delivery  Krystal Barahona is a 35 y.o.  who was admitted at 40w1d for induction of labor. She was initially 3/100/0. She was augmented with pitocin. She received an epidural for pain management. She spontaneously ruptured for clear fluids at 0709. She had recurrent variable decelerations requiring pitocin to be turned off and IUPC placed. Meconium stained fluid was observed at 1032. Pitocin was restarted and she progressed to complete at 11:28.     Reason for induction: Elective.   Induction method:  , ,None Elective.      Delivery  Route of Delivery: Vaginal, Spontaneous    Anesthesia: Epidural,   QBL: Non-Surgical QBL (mL): 26      QBL:        Delivery: Vaginal, Spontaneous at 2025 12:56 PM  Laceration: Perineal: None Repaired?      Baby's Weight: 3030 g (6 lb 10.9 oz); 106.88    Apgar scores: 8  and 9  at 1 and 5 minutes, respectively    Clinical Course: Post-Delivery:  The post delivery course was unremarkable.    On the day of discharge, the patient was ambulating, voiding spontaneously, tolerating oral intake, and hemodynamically stable. She was able to reasonably perform all ADLs. She had appropriate bowel function. Pain was well-controlled. She was discharged home on postpartum/postop day #1 without complications. Patient was  instructed to follow up with her OB as an outpatient and was given appropriate warnings to call her provider with problems or concerns.    Pertinent lab findings included:   Blood type A- s/p rhogam     Last three Hgb values:  Lab Results   Component Value Date    HGB 13.8 2025    HGB 10.9 (L) 2025    HGB 11.5 2025        Problem-specific follow-up plans included the following:  Assessment & Plan   (spontaneous vaginal delivery)  QBL 26; admit Hgb 13.8  - Continue routine postpartum care  - Pain mgt: IBU/ APAP   - encourage ambulation/ bonding   - lactation support   - PP contraception: uncertain   Nonimmune to hepatitis B virus  Hep B vaccine declined   Rh negative state in antepartum period, third trimester  Baby O positive, s/p postpartum rhogam 25  Not immune to rubella  MMR ordered 25, pt considering vaccination   Maternal varicella, non-immune  Declined Varivax PP      Discharge med list:  Contraception: undecided      Medication List      START taking these medications     acetaminophen 325 mg tablet; Commonly known as: TYLENOL; Take 2 tablets   (650 mg total) by mouth every 6 (six) hours as needed for mild pain   benzocaine-menthol-lanolin-aloe 20-0.5 % topical spray; Commonly known   as: DERMOPLAST; Apply 1 Application topically every 6 (six) hours as   needed for mild pain or irritation   hydrocortisone 1 % cream; Apply 1 Application topically daily as needed   for irritation   ibuprofen 600 mg tablet; Commonly known as: MOTRIN; Take 1 tablet (600   mg total) by mouth every 6 (six) hours as needed for mild pain   witch hazel-glycerin topical pad; Commonly known as: TUCKS; Apply 1 Pad   topically every 4 (four) hours as needed for irritation     CONTINUE taking these medications     cholecalciferol 400 units tablet; Commonly known as: VITAMIN D3   folic acid 800 MCG tablet; Commonly known as: FOLVITE   PRENATAL 1 PO     STOP taking these medications     magnesium 30 MG  tablet       Condition at discharge:   good     Disposition:   Home    Planned Readmission:   No    JOSH James           [1]   Patient Active Problem List  Diagnosis    Palpitations    Nausea/vomiting in pregnancy    Primigravida of advanced maternal age in third trimester    Nonimmune to hepatitis B virus    Rh negative state in antepartum period, third trimester    38 weeks gestation of pregnancy    Not immune to rubella    Maternal varicella, non-immune     (spontaneous vaginal delivery)

## 2025-07-24 NOTE — OB LABOR/OXYTOCIN SAFETY PROGRESS
Oxytocin Safety Progress Check Note - Krystal Barahona 35 y.o. female MRN: [id number]    Unit/Bed#: -01 Encounter: [id number]    Dose (fernando-units/min) Oxytocin: 4 fernando-units/min  Contraction Frequency (minutes): 3-4  Contraction Intensity: Mild/Moderate  Uterine Activity Characteristics: Regular  Cervical Dilation: 6        Cervical Effacement: 100  Fetal Station: 0  Baseline Rate (FHR): 150 bpm  Fetal Heart Rate (FHT): 140 BPM  FHR Category: II               Vital Signs:   Vitals:    07/24/25 0442   BP: 116/60   Pulse: 80   Resp:    Temp:    SpO2:        Notes/comments:   Patient presenting some earlies and lates after having an epidural, with moderate variability. Progressing into labor.  Plan: fluid bolus and maternal repositioning.      Rubin Jerome Jr, MD 7/24/2025 4:44 AM

## 2025-07-24 NOTE — ANESTHESIA POSTPROCEDURE EVALUATION
Post-Op Assessment Note    CV Status:  Stable    Pain management: adequate      Post-op block assessment: no complications and catheter intact   Mental Status:  Alert and awake   Hydration Status:  Euvolemic   PONV Controlled:  Controlled   Airway Patency:  Patent     Post Op Vitals Reviewed: Yes    No anethesia notable event occurred.    Staff: Anesthesiologist           Last Filed PACU Vitals:  Vitals Value Taken Time   Temp     Pulse     BP     Resp     SpO2

## 2025-07-24 NOTE — PLAN OF CARE
Problem: PAIN - ADULT  Goal: Verbalizes/displays adequate comfort level or baseline comfort level  Description: Interventions:  - Encourage patient to monitor pain and request assistance  - Assess pain using appropriate pain scale  - Administer analgesics as ordered based on type and severity of pain and evaluate response  - Implement non-pharmacological measures as appropriate and evaluate response  - Consider cultural and social influences on pain and pain management  - Notify physician/advanced practitioner if interventions unsuccessful or patient reports new pain  - Educate patient/family on pain management process including their role and importance of  reporting pain   - Provide non-pharmacologic/complimentary pain relief interventions  Outcome: Progressing     Problem: INFECTION - ADULT  Goal: Absence or prevention of progression during hospitalization  Description: INTERVENTIONS:  - Assess and monitor for signs and symptoms of infection  - Monitor lab/diagnostic results  - Monitor all insertion sites, i.e. indwelling lines, tubes, and drains  - Monitor endotracheal if appropriate and nasal secretions for changes in amount and color  - [place] appropriate cooling/warming therapies per order  - Administer medications as ordered  - Instruct and encourage patient and family to use good hand hygiene technique  - Identify and instruct in appropriate isolation precautions for identified infection/condition  Outcome: Progressing  Goal: Absence of fever/infection during neutropenic period  Description: INTERVENTIONS:  - Monitor WBC  - Perform strict hand hygiene  - Limit to healthy visitors only  - No plants, dried, fresh or silk flowers with pride in patient room  Outcome: Progressing     Problem: SAFETY ADULT  Goal: Patient will remain free of falls  Description: INTERVENTIONS:  - Educate patient/family on patient safety including physical limitations  - Instruct patient to call for assistance with activity   -  Consider consulting OT/PT to assist with strengthening/mobility based on AM PAC & JH-HLM score  - Consult OT/PT to assist with strengthening/mobility   - Keep Call bell within reach  - Keep bed low and locked with side rails adjusted as appropriate  - Keep care items and personal belongings within reach  - Initiate and maintain comfort rounds  - Make Fall Risk Sign visible to staff  - Offer Toileting every  Hours, in advance of need  - Initiate/Maintain alarm  - Obtain necessary fall risk management equipment:   - Apply yellow socks and bracelet for high fall risk patients  - Consider moving patient to room near nurses station  Outcome: Progressing  Goal: Maintain or return to baseline ADL function  Description: INTERVENTIONS:  -  Assess patient's ability to carry out ADLs; assess patient's baseline for ADL function and identify physical deficits which impact ability to perform ADLs (bathing, care of mouth/teeth, toileting, grooming, dressing, etc.)  - Assess/evaluate cause of self-care deficits   - Assess range of motion  - Assess patient's mobility; develop plan if impaired  - Assess patient's need for assistive devices and provide as appropriate  - Encourage maximum independence but intervene and supervise when necessary  - Involve family in performance of ADLs  - Assess for home care needs following discharge   - Consider OT consult to assist with ADL evaluation and planning for discharge  - Provide patient education as appropriate  - Monitor functional capacity and physical performance, use of AM PAC & JH-HLM   - Monitor gait, balance and fatigue with ambulation    Outcome: Progressing  Goal: Maintains/Returns to pre admission functional level  Description: INTERVENTIONS:  - Perform AM-PAC 6 Click Basic Mobility/ Daily Activity assessment daily.  - Set and communicate daily mobility goal to care team and patient/family/caregiver.   - Collaborate with rehabilitation services on mobility goals if consulted  -  Perform Range of Motion  times a day.  - Reposition patient every  hours.  - Dangle patient  times a day  - Stand patient  times a day  - Ambulate patient  times a day  - Out of bed to chair  times a day   - Out of bed for meals times a day  - Out of bed for toileting  - Record patient progress and toleration of activity level   Outcome: Progressing     Problem: DISCHARGE PLANNING  Goal: Discharge to home or other facility with appropriate resources  Description: INTERVENTIONS:  - Identify barriers to discharge w/patient and caregiver  - Arrange for needed discharge resources and transportation as appropriate  - Identify discharge learning needs (meds, wound care, etc.)  - Arrange for interpretive services to assist at discharge as needed  - Refer to Case Management Department for coordinating discharge planning if the patient needs post-hospital services based on physician/advanced practitioner order or complex needs related to functional status, cognitive ability, or social support system  Outcome: Progressing     Problem: Knowledge Deficit  Goal: Patient/family/caregiver demonstrates understanding of disease process, treatment plan, medications, and discharge instructions  Description: Complete learning assessment and assess knowledge base.  Interventions:  - Provide teaching at level of understanding  - Provide teaching via preferred learning methods  Outcome: Progressing  Goal: Verbalizes understanding of labor plan  Description: Assess patient/family/caregiver's baseline knowledge level and ability to understand information.  Provide education via patient/family/caregiver's preferred learning method at appropriate level of understanding.     1. Provide teaching at level of understanding.  2. Provide teaching via preferred learning method(s).  Outcome: Progressing     Problem: Labor & Delivery  Goal: Manages discomfort  Description: Assess and monitor for signs and symptoms of discomfort.  Assess patient's pain  level regularly and per hospital policy.  Administer medications as ordered. Support use of nonpharmacological methods to help control pain such as distraction, imagery, relaxation, and application of heat and cold.  Collaborate with interdisciplinary team and patient to determine appropriate pain management plan.    1. Include patient in decisions related to comfort.  2. Offer non-pharmacological pain management interventions.  3. Report ineffective pain management to physician.  Outcome: Progressing  Goal: Patient vital signs are stable  Description: 1. Assess vital signs - vaginal delivery.  Outcome: Progressing

## 2025-07-24 NOTE — OB LABOR/OXYTOCIN SAFETY PROGRESS
Oxytocin Safety Progress Check Note - Krystal Barahona 35 y.o. female MRN: [id number]    Unit/Bed#: -01 Encounter: [id number]    Dose (fernando-units/min) Oxytocin: 1 fernando-units/min (discussed with dr streeter and dr roman)  Contraction Frequency (minutes): 2-5  Contraction Intensity: Moderate  Uterine Activity Characteristics: Irregular  Cervical Dilation: 10  Dilation Complete Date: 07/24/25  Dilation Complete Time: 1125  Cervical Effacement: 100  Fetal Station: 1  Baseline Rate (FHR): 135 bpm  Fetal Heart Rate (FHT): 140 BPM                 Vital Signs:   Vitals:    07/24/25 1115   BP: 113/72   Pulse: 77   Resp:    Temp:    SpO2:        Notes/comments:   Meconium fluid noted    Fully dilated will start pushing       Nicole Herrera MD 7/24/2025 11:28 AM

## 2025-07-24 NOTE — OB LABOR/OXYTOCIN SAFETY PROGRESS
Oxytocin Safety Progress Check Note - Krystal Barahona 35 y.o. female MRN: [id number]    Unit/Bed#: -01 Encounter: [id number]    Dose (fernando-units/min) Oxytocin: 0 fernando-units/min  Contraction Frequency (minutes): 2-5  Contraction Intensity: Moderate  Uterine Activity Characteristics: Irregular  Cervical Dilation: 7        Cervical Effacement: 100  Fetal Station: 1  Baseline Rate (FHR): 135 bpm  Fetal Heart Rate (FHT): 140 BPM  FHR Category: 2               Vital Signs:   Vitals:    07/24/25 0915   BP: 105/70   Pulse: 78   Resp:    Temp:    SpO2: 100%       Notes/comments:   Patient comfortable with epidural. FHT category 2 due to recurrent variables. Continue resuscitation with IVF and repositioning. IUPC in place. Moderate variability present. SVE as above. Plan to restart pitocin titration. Will reassess in 2 hours or sooner if clinically indicated. Dr. Bryan aware.      Aggie Walker MD 7/24/2025 9:37 AM

## 2025-07-24 NOTE — PROCEDURES
Krystal Barahona, a  at 40w0d with an KARAN of 2025, by Ultrasound, was seen at [facility] for the following procedure(s): $Procedure Type: US - abdominal]                   Ultrasound Other  Fetal Presentation: Vertex

## 2025-07-24 NOTE — H&P
H & P- Obstetrics   Krystal Barahona 35 y.o. female MRN: [id number]  Unit/Bed#: -01 Encounter: [id number]    Assessment: 35 y.o.  at 40w0d admitted for elective induction of labor.  SVE:3/100/0/soft/mid  FHT: Reactive 140 bpm  Clinical EFW: 7 lbs by Leopolds ; Vertex confirmed by TAUS  GBS status: Negative   Postpartum contraception plan: Undecided    Plan:   Maternal varicella, non-immune  Assessment & Plan  Offer Varivax PP    Not immune to rubella  Assessment & Plan  Offer MMR PP    Rh negative state in antepartum period, third trimester  Assessment & Plan  Rhogam Panel postpartum    Nonimmune to hepatitis B virus  Assessment & Plan  Offer Hep B vaccine PP    Primigravida of advanced maternal age in third trimester  Assessment & Plan  35 y.o    * Encounter for elective induction of labor  Assessment & Plan  Admit to L&D  CBC, RPR, Type & Screen  SVE: 0.5/60/-3  FHT: Reactive 140 bpm  Clinical EFW: 7 lbs by Leopolds ; Vertex confirmed by TAUS  GBS status: Negative   Postpartum contraception plan: Undecided  Analgesia at maternal request  Expectant management                  Chief Complaint: induction of labor    HPI: Krystal Barahona is a 35 y.o.  with an KARAN of 2025, by Ultrasound at 40w0d who is being admitted for induction of labor. She complains of uterine contractions, occurring irregularly, has no LOF, and reports no VB. She states she has felt good FM.    Problem List[1]    Baby complications/comments: none    Review of Systems   Constitutional:  Negative for chills and fever.   HENT:  Negative for ear pain and sore throat.    Eyes:  Negative for pain and visual disturbance.   Respiratory:  Negative for cough and shortness of breath.    Cardiovascular:  Negative for chest pain and palpitations.   Gastrointestinal:  Negative for abdominal pain, nausea and vomiting.   Genitourinary:  Negative for dysuria, hematuria and vaginal bleeding.   Musculoskeletal:  Negative for arthralgias and  back pain.   Skin:  Negative for color change and rash.   Neurological:  Negative for seizures, syncope and headaches.   All other systems reviewed and are negative.      OB Hx:  OB History    Para Term  AB Living   3 0 0  2 0   SAB IAB Ectopic Multiple Live Births    2   0      # Outcome Date GA Lbr Tevin/2nd Weight Sex Type Anes PTL Lv   3 Current            2 IAB            1 IAB                Past Medical Hx:  Past Medical History[2]    Past Surgical hx:  Past Surgical History[3]    Social Hx:  Alcohol use: No  Tobacco use: No  Other substance use: No    Other: No    Allergies[4]      Medications Prior to Admission:     cholecalciferol (VITAMIN D3) 400 units tablet    folic acid (FOLVITE) 800 MCG tablet    magnesium 30 MG tablet    Prenatal MV-Min-Fe Fum-FA-DHA (PRENATAL 1 PO)    Objective:     There is no height or weight on file to calculate BMI.     Physical Exam:  Physical Exam  Constitutional:       Appearance: Normal appearance.   Genitourinary:      Vulva normal.   Pulmonary:      Effort: Pulmonary effort is normal. No respiratory distress.   Abdominal:      Palpations: Abdomen is soft.      Tenderness: There is no abdominal tenderness.      Comments: Gravid     Neurological:      Mental Status: She is alert.     Skin:     General: Skin is warm and dry.     Psychiatric:         Mood and Affect: Mood normal.         Behavior: Behavior normal.   Vitals reviewed.            FHT: Category 1   140s reactive, moderate variability, no decelerations, accelerations presents       TOCO: contractions every 2-4 mins    Vertex on US    Clinical EFW 7 lbs    Cervix: 3/00/0/mid soft       Lab Results   Component Value Date    WBC 11.60 (H) 2025    HGB 10.9 (L) 2025    HCT 34.1 (L) 2025     2025     Lab Results   Component Value Date    K 3.6 2025     2025    CO2 25 2025    BUN 7 2025    CREATININE 0.54 (L) 2025    AST 14 2025     ALT 11 01/20/2025     Prenatal Labs: Reviewed      Blood type: A negative  Antibody: Positive  GBS: Negative  HIV: Non-reactive  Rubella: NI  Syphilis IgM/IgG: Non-reactive  HBsAg: Non-reactive  HCAb: Non-reactive  Chlamydia: Negative  Gonorrhea: Negative  Diabetes 1 hour screen: 71  3 hour glucose: n/a  Platelets: 221  Hgb: 10.9  >2 Midnights  INPATIENT            [1]   Patient Active Problem List  Diagnosis    Palpitations    Nausea/vomiting in pregnancy    Primigravida of advanced maternal age in third trimester    Nonimmune to hepatitis B virus    Rh negative state in antepartum period, third trimester    38 weeks gestation of pregnancy    Not immune to rubella    Maternal varicella, non-immune    Encounter for elective induction of labor   [2] No past medical history on file.  [3] No past surgical history on file.  [4] No Known Allergies

## 2025-07-24 NOTE — PLAN OF CARE
Problem: PAIN - ADULT  Goal: Verbalizes/displays adequate comfort level or baseline comfort level  Description: Interventions:  - Encourage patient to monitor pain and request assistance  - Assess pain using appropriate pain scale  - Administer analgesics as ordered based on type and severity of pain and evaluate response  - Implement non-pharmacological measures as appropriate and evaluate response  - Consider cultural and social influences on pain and pain management  - Notify physician/advanced practitioner if interventions unsuccessful or patient reports new pain  - Educate patient/family on pain management process including their role and importance of  reporting pain   - Provide non-pharmacologic/complimentary pain relief interventions  Outcome: Progressing     Problem: INFECTION - ADULT  Goal: Absence or prevention of progression during hospitalization  Description: INTERVENTIONS:  - Assess and monitor for signs and symptoms of infection  - Monitor lab/diagnostic results  - Monitor all insertion sites, i.e. indwelling lines, tubes, and drains  - Monitor endotracheal if appropriate and nasal secretions for changes in amount and color  - [place] appropriate cooling/warming therapies per order  - Administer medications as ordered  - Instruct and encourage patient and family to use good hand hygiene technique  - Identify and instruct in appropriate isolation precautions for identified infection/condition  Outcome: Progressing  Goal: Absence of fever/infection during neutropenic period  Description: INTERVENTIONS:  - Monitor WBC  - Perform strict hand hygiene  - Limit to healthy visitors only  - No plants, dried, fresh or silk flowers with pride in patient room  Outcome: Progressing     Problem: SAFETY ADULT  Goal: Patient will remain free of falls  Description: INTERVENTIONS:  - Educate patient/family on patient safety including physical limitations  - Instruct patient to call for assistance with activity   -  Consider consulting OT/PT to assist with strengthening/mobility based on AM PAC & JH-HLM score  - Consult OT/PT to assist with strengthening/mobility   - Keep Call bell within reach  - Keep bed low and locked with side rails adjusted as appropriate  - Keep care items and personal belongings within reach  - Initiate and maintain comfort rounds  - Make Fall Risk Sign visible to staff  - Apply yellow socks and bracelet for high fall risk patients  - Consider moving patient to room near nurses station  Outcome: Progressing  Goal: Maintain or return to baseline ADL function  Description: INTERVENTIONS:  -  Assess patient's ability to carry out ADLs; assess patient's baseline for ADL function and identify physical deficits which impact ability to perform ADLs (bathing, care of mouth/teeth, toileting, grooming, dressing, etc.)  - Assess/evaluate cause of self-care deficits   - Assess range of motion  - Assess patient's mobility; develop plan if impaired  - Assess patient's need for assistive devices and provide as appropriate  - Encourage maximum independence but intervene and supervise when necessary  - Involve family in performance of ADLs  - Assess for home care needs following discharge   - Consider OT consult to assist with ADL evaluation and planning for discharge  - Provide patient education as appropriate  - Monitor functional capacity and physical performance, use of AM PAC & JH-HLM   - Monitor gait, balance and fatigue with ambulation    Outcome: Progressing  Goal: Maintains/Returns to pre admission functional level  Description: INTERVENTIONS:  - Perform AM-PAC 6 Click Basic Mobility/ Daily Activity assessment daily.  - Set and communicate daily mobility goal to care team and patient/family/caregiver.   - Collaborate with rehabilitation services on mobility goals if consulted  - Out of bed for toileting  - Record patient progress and toleration of activity level   Outcome: Progressing     Problem: DISCHARGE  PLANNING  Goal: Discharge to home or other facility with appropriate resources  Description: INTERVENTIONS:  - Identify barriers to discharge w/patient and caregiver  - Arrange for needed discharge resources and transportation as appropriate  - Identify discharge learning needs (meds, wound care, etc.)  - Arrange for interpretive services to assist at discharge as needed  - Refer to Case Management Department for coordinating discharge planning if the patient needs post-hospital services based on physician/advanced practitioner order or complex needs related to functional status, cognitive ability, or social support system  Outcome: Progressing     Problem: Knowledge Deficit  Goal: Patient/family/caregiver demonstrates understanding of disease process, treatment plan, medications, and discharge instructions  Description: Complete learning assessment and assess knowledge base.  Interventions:  - Provide teaching at level of understanding  - Provide teaching via preferred learning methods  Outcome: Progressing  Goal: Verbalizes understanding of labor plan  Description: Assess patient/family/caregiver's baseline knowledge level and ability to understand information.  Provide education via patient/family/caregiver's preferred learning method at appropriate level of understanding.     1. Provide teaching at level of understanding.  2. Provide teaching via preferred learning method(s).  Outcome: Progressing     Problem: Labor & Delivery  Goal: Manages discomfort  Description: Assess and monitor for signs and symptoms of discomfort.  Assess patient's pain level regularly and per hospital policy.  Administer medications as ordered. Support use of nonpharmacological methods to help control pain such as distraction, imagery, relaxation, and application of heat and cold.  Collaborate with interdisciplinary team and patient to determine appropriate pain management plan.    1. Include patient in decisions related to comfort.  2.  Offer non-pharmacological pain management interventions.  3. Report ineffective pain management to physician.  Outcome: Progressing  Goal: Patient vital signs are stable  Description: 1. Assess vital signs - vaginal delivery.  Outcome: Progressing

## 2025-07-24 NOTE — ASSESSMENT & PLAN NOTE
Recovering well   Routine postpartum care, encourage ambulation, encourage familial bonding  Lactation support for breast/bottle feeding as needed  Pre-delivery Hgb 13.8 FEN: Tolerating regular diet  Pain: Motrin/Tylenol around the clock, oxycodone if needed  Appropriate bowel and bladder function   Postpartum Contraceptive plan: undecided  DVT Ppx: Ambulation

## 2025-07-24 NOTE — OB LABOR/OXYTOCIN SAFETY PROGRESS
Labor Progress Note - Krystal Barahona 35 y.o. female MRN: [id number]    Unit/Bed#: -01 Encounter: [id number]    Dose (fernando-units/min) Oxytocin: 0 fernando-units/min  Contraction Frequency (minutes): 2-4  Contraction Intensity: Mild/Moderate  Uterine Activity Characteristics: Regular  Cervical Dilation: 6        Cervical Effacement: 100  Fetal Station: 0  Baseline Rate (FHR): 145 bpm  Fetal Heart Rate (FHT): 140 BPM  FHR Category: 2               Vital Signs:   Vitals:    07/24/25 0647   BP:    Pulse: 87   Resp:    Temp:    SpO2: 97%       Notes/comments:   FHT drops to the 70s, variables that improve spontaneously. Placed an IUPC and plan for amnioinfusion.     Alexia Barr MD 7/24/2025 7:14 AM

## 2025-07-24 NOTE — ANESTHESIA PREPROCEDURE EVALUATION
Procedure:  LABOR ANALGESIA    Relevant Problems   GYN   (+) 38 weeks gestation of pregnancy        Physical Exam    Airway     Mallampati score: II  TM Distance: >3 FB        Cardiovascular  Rhythm: regular    Dental       Pulmonary   Breath sounds clear to auscultation    Neurological      Other Findings  post-pubertal.      Anesthesia Plan  ASA Score- 2     Anesthesia Type- epidural and general with ASA Monitors.         Additional Monitors:     Airway Plan:            Plan Factors-Exercise tolerance (METS): >4 METS.    Chart reviewed.   Existing labs reviewed. Patient summary reviewed.    Patient is not a current smoker.              Induction-     Postoperative Plan- .   Monitoring Plan - Monitoring plan - standard ASA monitoring      Perioperative Resuscitation Plan - Level 1 - Full Code.       Informed Consent- Anesthetic plan and risks discussed with patient.        NPO Status:  Vitals Value Taken Time   Date of last liquid 07/23/25 07/23/25 21:09   Time of last liquid 2110 07/23/25 21:09   Date of last solid 07/23/25 07/23/25 21:09   Time of last solid 1500 07/23/25 21:09

## 2025-07-24 NOTE — L&D DELIVERY NOTE
Vaginal Delivery Note - OB/GYN   Krystal Barahona 35 y.o. female MRN: [id number]  Unit/Bed#: -01 Encounter: [id number]    Pre-delivery Diagnosis:   1. Pregnancy at 40w1d  2. Nonimmune to Hepatits B   3. Rh negative status   4. Rubella nonimmune  5. Varicella nonimmune    Post-delivery Diagnosis: same, delivered    Procedure: Spontaneous Vaginal Delivery    Attending: Nicole Herrera MD    Assistant(s): Aggie Walker MD    Anesthesia: Epidural    QBL: 26 cc    Complications: None    Specimens:   1. Arterial, venous cord gases  2. Cord blood  3. Placenta to Storage    Findings:  1.   without laceration  2. Viable female on 2025 at 1256, with APGARS of 8 and 9 at 1 and 5 minutes respectively,  3. Placenta delivered spontaneously   4. Umbilical Cord Venous Blood Gas:  Results from last 7 days   Lab Units 25  1257   PH COV  7.335   PCO2 COV mm HG 33.5   HCO3 COV mmol/L 17.5   BASE EXC COV mmol/L -7.3*   O2 CT CD VB mL/dL 14.6   O2 HGB, VENOUS CORD % 72.1     5. Umbilical Cord Arterial Blood Gas:  Results from last 7 days   Lab Units 25  1257   PH COA  7.260   PCO2 COA  43.7   PO2 COA mm HG 32.3*   HCO3 COA mmol/L 19.2   BASE EXC COA mmol/L -7.7*   O2 CONTENT CORD ART ml/dl 12.5   O2 HGB, ARTERIAL CORD % 60.1       Disposition:  Patient is recovering in labor and delivery room.     Brief history and labor course:  Ms. Krystal Barahona is a 35 y.o.  @ 40w1d.  When she presented to labor and delivery and on initial exam she was noted to be 3/100/0; Her FHT was Category 1. She was admitted for Elective Induction of Labor. After admission she received Pitocin. She achieved full dilation at 11:25 and started pushing at 11:25.     Description of procedure:  At 12:56  the patient delivered a viable , apgars of 8 (1 min) and 9 (5 min). The fetal vertex delivered spontaneously. There was no nuchal cord. The baby restituted to Left Occiput Anterior. The anterior shoulder delivered  atraumatically with maternal expulsive forces and the assistance of gentle downward traction. The posterior shoulder delivered with maternal expulsive forces and the assistance of gentle upward traction. The remainder of the fetus delivered spontaneously.    Upon delivery, the infant was placed on the mothers abdomen and the cord was clamped and cut after the cord stopped pulsating. The infant was noted to cry spontaneously and was moving all extremities appropriately. There was no evidence of injury. Awaiting resuscitators evaluated the . Arterial and venous cord blood gases and cord blood was collected for analysis. These were promptly sent to the lab.     Active management of the third stage of labor was undertaken with IV pitocin at 250 fernando-units/min and the uterus was noted to contract down well with massage and pitocin. At 13:03 the placenta was delivered intact with fundal massage and gentle traction on the cord with active management of the third stage of labor and was noted to have a 3 vessel cord. A bimanual exam was performed and bleeding was noted to be under control. The vagina, cervix, perineum, and rectum were inspected and there was noted to be a none laceration.      Disposition:  The Patient tolerated the procedure well and was recovering in labor and delivery room     Attending Supervision:   Dr. Nicole Herrera MD was present for the entire procedure.    Aggie Walker MD  OB/GYN PGY-1  2025 1:29 PM

## 2025-07-24 NOTE — ANESTHESIA PROCEDURE NOTES
Epidural Block    Patient location during procedure: OB/L&D  Start time: 7/24/2025 4:19 AM  Staffing  Performed by: Erasmo Fuentes DO  Authorized by: Erasmo Fuentes DO    Preanesthetic Checklist  Completed: patient identified, IV checked, site marked, risks and benefits discussed, surgical consent, monitors and equipment checked, pre-op evaluation and timeout performed  Epidural  Patient position: sitting  Prep: Betadine  Sedation Level: no sedation  Patient monitoring: continuous pulse oximetry, heart rate and frequent blood pressure checks  Approach: midline  Location: lumbar, L3-4  Injection technique: JAYY air  Needle  Needle type: Tuohy   Needle gauge: 18 G  Catheter type: side hole  Catheter size: 20 G  Catheter securement method: tape  Test dose: negativeropivacaine (NAROPIN) 0.2% injection 10 mL - Epidural   10 mL - 7/24/2025 4:22:00 AM  Assessment  Sensory level: T10  Number of attempts: 1negative aspiration for CSF, negative aspiration for heme and no paresthesia on injection  patient tolerated the procedure well with no immediate complications

## 2025-07-24 NOTE — OB LABOR/OXYTOCIN SAFETY PROGRESS
Oxytocin Safety Progress Check Note - Krystal Barahona 35 y.o. female MRN: [id number]    Unit/Bed#: -01 Encounter: [id number]    Dose (fernando-units/min) Oxytocin: 4 fernando-units/min  Contraction Frequency (minutes): 2-3  Contraction Intensity: Mild/Moderate  Uterine Activity Characteristics: Regular  Cervical Dilation: 4-5        Cervical Effacement: 100  Fetal Station: 0  Baseline Rate (FHR): 150 bpm  Fetal Heart Rate (FHT): 140 BPM  FHR Category: I               Vital Signs:   Vitals:    07/24/25 0254   BP: 109/69   Pulse: (!) 113   Resp:    Temp:        Notes/comments:   Patient started feeling uncomfortable with contractions, is not considering an epidural right now.  SVE as above.  Discussed about the induction course.    Rubin Jerome Jr, MD 7/24/2025 3:29 AM

## 2025-07-24 NOTE — OB LABOR/OXYTOCIN SAFETY PROGRESS
Oxytocin Safety Progress Check Note - Krystal Barahona 35 y.o. female MRN: [id number]    Unit/Bed#: -01 Encounter: [id number]    Dose (fernando-units/min) Oxytocin: 0 fernando-units/min  Contraction Frequency (minutes): 2-4  Contraction Intensity: Mild/Moderate  Uterine Activity Characteristics: Regular  Cervical Dilation: 6        Cervical Effacement: 100  Fetal Station: 0  Baseline Rate (FHR): 140 bpm  Fetal Heart Rate (FHT): 140 BPM  FHR Category: II               Vital Signs:   Vitals:    07/24/25 0521   BP: 103/56   Pulse: 85   Resp:    Temp:    SpO2:        Notes/comments:   Patient is 6/100/0, intact. received and epidural. presented some lates pit was down from 4 to 2, and a 3 minutes decel, resolving with pit off and fluid bolus, now presented an 1 minute decl. Now resolving and having cat I strip.     Rubin Jerome Jr, MD 7/24/2025 5:37 AM

## 2025-07-25 VITALS
HEIGHT: 68 IN | SYSTOLIC BLOOD PRESSURE: 109 MMHG | TEMPERATURE: 98.2 F | RESPIRATION RATE: 18 BRPM | WEIGHT: 159 LBS | HEART RATE: 69 BPM | OXYGEN SATURATION: 98 % | DIASTOLIC BLOOD PRESSURE: 76 MMHG | BODY MASS INDEX: 24.1 KG/M2

## 2025-07-25 LAB
DME PARACHUTE DELIVERY DATE ACTUAL: NORMAL
DME PARACHUTE DELIVERY DATE REQUESTED: NORMAL
DME PARACHUTE ITEM DESCRIPTION: NORMAL
DME PARACHUTE ORDER STATUS: NORMAL
DME PARACHUTE SUPPLIER NAME: NORMAL
DME PARACHUTE SUPPLIER PHONE: NORMAL

## 2025-07-25 PROCEDURE — 99024 POSTOP FOLLOW-UP VISIT: CPT | Performed by: NURSE PRACTITIONER

## 2025-07-25 PROCEDURE — 90707 MMR VACCINE SC: CPT | Performed by: NURSE PRACTITIONER

## 2025-07-25 RX ORDER — BENZOCAINE/MENTHOL 6 MG-10 MG
1 LOZENGE MUCOUS MEMBRANE DAILY PRN
Start: 2025-07-25

## 2025-07-25 RX ORDER — ACETAMINOPHEN 325 MG/1
650 TABLET ORAL EVERY 6 HOURS PRN
Start: 2025-07-25

## 2025-07-25 RX ORDER — IBUPROFEN 600 MG/1
600 TABLET, FILM COATED ORAL EVERY 6 HOURS PRN
Start: 2025-07-25

## 2025-07-25 RX ADMIN — IBUPROFEN 600 MG: 600 TABLET, FILM COATED ORAL at 02:58

## 2025-07-25 RX ADMIN — ACETAMINOPHEN 650 MG: 325 TABLET ORAL at 08:39

## 2025-07-25 RX ADMIN — MEASLES, MUMPS, AND RUBELLA VIRUS VACCINE LIVE 0.5 ML: 1000; 12500; 1000 INJECTION, POWDER, LYOPHILIZED, FOR SUSPENSION SUBCUTANEOUS at 09:58

## 2025-07-25 RX ADMIN — ACETAMINOPHEN 650 MG: 325 TABLET ORAL at 02:58

## 2025-07-25 RX ADMIN — IBUPROFEN 600 MG: 600 TABLET, FILM COATED ORAL at 08:39

## 2025-07-25 RX ADMIN — DOCUSATE SODIUM 100 MG: 100 CAPSULE, LIQUID FILLED ORAL at 08:39

## 2025-07-25 NOTE — PLAN OF CARE
Problem: PAIN - ADULT  Goal: Verbalizes/displays adequate comfort level or baseline comfort level  Description: Interventions:  - Encourage patient to monitor pain and request assistance  - Assess pain using appropriate pain scale  - Administer analgesics as ordered based on type and severity of pain and evaluate response  - Implement non-pharmacological measures as appropriate and evaluate response  - Consider cultural and social influences on pain and pain management  - Notify physician/advanced practitioner if interventions unsuccessful or patient reports new pain  - Educate patient/family on pain management process including their role and importance of  reporting pain   - Provide non-pharmacologic/complimentary pain relief interventions  Outcome: Progressing     Problem: INFECTION - ADULT  Goal: Absence or prevention of progression during hospitalization  Description: INTERVENTIONS:  - Assess and monitor for signs and symptoms of infection  - Monitor lab/diagnostic results  - Monitor all insertion sites, i.e. indwelling lines, tubes, and drains  - Monitor endotracheal if appropriate and nasal secretions for changes in amount and color  - [place] appropriate cooling/warming therapies per order  - Administer medications as ordered  - Instruct and encourage patient and family to use good hand hygiene technique  - Identify and instruct in appropriate isolation precautions for identified infection/condition  Outcome: Progressing  Goal: Absence of fever/infection during neutropenic period  Description: INTERVENTIONS:  - Monitor WBC  - Perform strict hand hygiene  - Limit to healthy visitors only  - No plants, dried, fresh or silk flowers with pride in patient room  Outcome: Progressing     Problem: SAFETY ADULT  Goal: Patient will remain free of falls  Description: INTERVENTIONS:  - Educate patient/family on patient safety including physical limitations  - Instruct patient to call for assistance with activity   -  Consider consulting OT/PT to assist with strengthening/mobility based on AM PAC & JH-HLM score  - Consult OT/PT to assist with strengthening/mobility   - Keep Call bell within reach  - Keep bed low and locked with side rails adjusted as appropriate  - Keep care items and personal belongings within reach  - Initiate and maintain comfort rounds  - Make Fall Risk Sign visible to staff  - Apply yellow socks and bracelet for high fall risk patients  - Consider moving patient to room near nurses station  Outcome: Progressing  Goal: Maintain or return to baseline ADL function  Description: INTERVENTIONS:  -  Assess patient's ability to carry out ADLs; assess patient's baseline for ADL function and identify physical deficits which impact ability to perform ADLs (bathing, care of mouth/teeth, toileting, grooming, dressing, etc.)  - Assess/evaluate cause of self-care deficits   - Assess range of motion  - Assess patient's mobility; develop plan if impaired  - Assess patient's need for assistive devices and provide as appropriate  - Encourage maximum independence but intervene and supervise when necessary  - Involve family in performance of ADLs  - Assess for home care needs following discharge   - Consider OT consult to assist with ADL evaluation and planning for discharge  - Provide patient education as appropriate  - Monitor functional capacity and physical performance, use of AM PAC & JH-HLM   - Monitor gait, balance and fatigue with ambulation    Outcome: Progressing  Goal: Maintains/Returns to pre admission functional level  Description: INTERVENTIONS:  - Perform AM-PAC 6 Click Basic Mobility/ Daily Activity assessment daily.  - Set and communicate daily mobility goal to care team and patient/family/caregiver.   - Collaborate with rehabilitation services on mobility goals if consulted  - Out of bed for toileting  - Record patient progress and toleration of activity level   Outcome: Progressing     Problem: DISCHARGE  PLANNING  Goal: Discharge to home or other facility with appropriate resources  Description: INTERVENTIONS:  - Identify barriers to discharge w/patient and caregiver  - Arrange for needed discharge resources and transportation as appropriate  - Identify discharge learning needs (meds, wound care, etc.)  - Arrange for interpretive services to assist at discharge as needed  - Refer to Case Management Department for coordinating discharge planning if the patient needs post-hospital services based on physician/advanced practitioner order or complex needs related to functional status, cognitive ability, or social support system  Outcome: Progressing     Problem: Knowledge Deficit  Goal: Patient/family/caregiver demonstrates understanding of disease process, treatment plan, medications, and discharge instructions  Description: Complete learning assessment and assess knowledge base.  Interventions:  - Provide teaching at level of understanding  - Provide teaching via preferred learning methods  Outcome: Progressing  Goal: Verbalizes understanding of labor plan  Description: Assess patient/family/caregiver's baseline knowledge level and ability to understand information.  Provide education via patient/family/caregiver's preferred learning method at appropriate level of understanding.     1. Provide teaching at level of understanding.  2. Provide teaching via preferred learning method(s).  Outcome: Progressing     Problem: Labor & Delivery  Goal: Manages discomfort  Description: Assess and monitor for signs and symptoms of discomfort.  Assess patient's pain level regularly and per hospital policy.  Administer medications as ordered. Support use of nonpharmacological methods to help control pain such as distraction, imagery, relaxation, and application of heat and cold.  Collaborate with interdisciplinary team and patient to determine appropriate pain management plan.    1. Include patient in decisions related to comfort.  2.  Offer non-pharmacological pain management interventions.  3. Report ineffective pain management to physician.  Outcome: Progressing  Goal: Patient vital signs are stable  Description: 1. Assess vital signs - vaginal delivery.  Outcome: Progressing

## 2025-07-25 NOTE — LACTATION NOTE
This note was copied from a baby's chart.  CONSULT - LACTATION  Baby Girl Barahona (Sharon) 1 days female MRN: [id number]    [facility] NURSERY Room / Bed: (N)/(N) Encounter: [id number]    Maternal Information     MOTHER:  Krystal Barahona  Maternal Age: 35 y.o.  OB History: # 1 - Date: 2018, Sex: None, Weight: None, GA: None, Type: None, Apgar1: None, Apgar5: None, Living: None, Birth Comments: None    # 2 - Date: 2021, Sex: None, Weight: None, GA: None, Type: None, Apgar1: None, Apgar5: None, Living: None, Birth Comments: None    # 3 - Date: 07/24/25, Sex: Female, Weight: 3030 g (6 lb 10.9 oz), GA: 40w1d, Type: Vaginal, Spontaneous, Apgar1: 8, Apgar5: 9, Living: Living, Birth Comments: None     History of any breast surgeries? NO  History of PCOS, hypothyroidism, or other endocrine disorders?   Breast and/or nipple changes during pregnancy? YES, Breasts got larger, areolas and nipples darkened  Did you get a breast pump through insurance for this pregnancy? NO, CM consult placed for Spectra S9. Patient has a Spectra S2 at home that was gifted to her.  History of breastfeeding? NO  If previously breast fed, any previous feeding or lactating challenges? N/A  What is your feeding goal? KSINTENT: Provide as much breast milk as possible, exclusively breastfeed. Formula was listed under feeding intent just incase she was having difficulty breastfeeding.    Birth information:  YOB: 2025   Time of birth: 12:56 PM   Sex: female   Delivery type: Vaginal, Spontaneous   Birth Weight: 3030 g (6 lb 10.9 oz)   Percent of Weight Change: 0%     Gestational Age: 40w1d   [unfilled]    Reason for Consult:    Reason for Consult: Initial assessment (ext) - 20 min, Latch Assess (ext) - 20 min, Discharge (ext) - 20 min    Breast and nipple assessment:   Breasts/Nipples  Left Breast: Soft  Right Breast: Soft  Left Nipple: Everted (Compressed upon encounter)  Right Nipple:  Everted (Rounded on right side)  Breastfeeding Status: Yes  Breastfeeding Progress: Breastfeeding well    Breast Pump:    Breast Pump  Pump: Personal (CM consult placed for spectra S9, mom has a spectra S2 that was gifted to her.)      [place] Assessment: normal assessment, appearance of a slight oral restriction under tongue, but baby is feeding well and can bring tongue out past lower lip. Baby has great lateralization of tongue, not a concern at this time.    Feeding assessment: feeding well, mom working on a deeper latch.  LATCH:  Latch: Grasps breast, tongue down, lips flanged, rhythmic sucking   Audible Swallowing: Spontaneous and intermittent (24 hours old)   Type of Nipple: Everted (After stimulation)   Comfort (Breast/Nipple): Soft/non-tender   Hold (Positioning): Partial assist, teach one side, mother does other, staff holds   LATCH Score: 9       Feeding recommendations:  breast feed on demand    Met with Krystal and baby's grandmother today for an initial and discharge consult.    Patient says she is breastfeeding well and baby is eating great, this is patient's first time breastfeeding and first baby. Patient was latching at time of encounter in cradle position, latch was very shallow and nipple was compressed after baby came off the breast on the left side. Baby was only feeding on the nipple, patient reports her nipples are not hurting, but she experiences initial latching pain that goes away. Patient was guided and instructed to move baby to the right side and start in football position because mom is right handed, and feels more comfortable supporting baby's head with her right hand. Discussed with pt that achieving a good, deep latch is not only beneficial for mom to reduce nipple and breast pain, but to accomplish sufficient milk transfer to the baby.     Instructed patient on how to latch using the directions below (directions listened below for patient reference).  Support your baby’s head by  holding behind the ears at the nape of their neck and between their upper shoulder blades.  Align your baby’s nose to your nipple  Have your baby’s baby facing your belly  Your baby’s ear, shoulder, and hip should be in alignment  Use a “sandwich hold” with your hand to support your breast to provide an easier way for your baby to latch on deep enough  Bring your baby to your breast, not your breast to your baby  Your baby’s chin should be anchored into your breast for a deeper latch, as this allows your baby to elongate their airway and neck   Your baby’s upper and lower lips should be flanged  Your baby SHOULD NOT just be latched onto your nipple. This is a shallow latch.  If the latch hurts, break the seal by taking a clean finger and inserting it into the corner of your baby’s mouth. There should not be pain when your baby is feeding.    Demonstrated to patient how to perform breast compressions while feeding, patient states she took the [facility] breastfeeding classes and is familiar with this concept and hand expression.    Explained to patient that skin to skin can help soothe baby, especially during cluster feeding periods in the first few days and weeks of life. Informed patient that skin to skin also allows baby to show feeding cues that can be caught early so that feeding can take place when it is most optimal. Educated patient regarding how hunger feeding cues can look like a baby bringing their hand to their mouth, opening mouth and turning their head, sucking on their hands, and having tight/clenched fists. Informed patient that if any of these signs are exhibited, bring the baby to breast. Education was given to patient that fullness cues are exhibited by having relaxed arms and hands that fall away from the body, sucking has stopped, eating has slowed or stopped, or baby has fallen asleep. Reviewed stool color changes with patient.    Patient was given information regarding [facility] center, and  was encouraged to follow up if lactation support is needed once patient is home with baby.     Ann Jean 7/25/2025 9:53 AM

## 2025-07-25 NOTE — CASE MANAGEMENT
Case Management Progress Note    Patient name Krystal Barahona  Location /-01 MRN [id number]  : 1990 Date 2025       LOS (days): 2  Geometric Mean LOS (GMLOS) (days):   Days to GMLOS:        OBJECTIVE:        Current admission status: Inpatient  Preferred Pharmacy:   [id number] [place] - [address]  [address] [place] [address]  Phone: [phone] Fax: [phone]    Primary Care Provider: Rachel Seymour PA-C    Primary Insurance: BLUE CROSS  Secondary Insurance:     PROGRESS NOTE:    Mom/Baby/NICU Assessment  Does MOB have a breast pump?: No, CM to order.  Pump type ordered: Spectra S9  Pump delivered by: Ship to home (approved)  Durable medical equipment (DME) needed:: Breast pump

## 2025-07-25 NOTE — UTILIZATION REVIEW
"NOTIFICATION OF INPATIENT ADMISSION   MATERNITY/DELIVERY AUTHORIZATION REQUEST   SERVICING FACILITY:   [facility]  Parent Child Health - L&D, [place], NICU  [address] [place] [id number]  Tax ID: [id number]  NPI: [id number] ATTENDING PROVIDER:  Attending Name and NPI#: Jarek Beckwith Md [[id number]]  Address: [address] [place] [address]  Phone: [phone]     ADMISSION INFORMATION:  Place of Service: Inpatient [facility]  Place of Service Code: 21  Inpatient Admission Date/Time: 25  9:03 PM  Discharge Date/Time: No discharge date for patient encounter.  Admitting Diagnosis Code/Description:  Encounter for full-term uncomplicated delivery [O80]   Mother: Krystal Barahona 1990 Estimated Date of Delivery: 25  Delivering clinician: Nicole Herrera   OB History          3    Para   1    Term   1            AB   2    Living   1         SAB        IAB   2    Ectopic        Multiple   0    Live Births   1               [place] Name & MRN:   Information for the patient's :  Jun, Baby Girl (Krystal) [[id number]]   [place] Delivery Information:  Sex: female  Delivered 2025 12:56 PM by Vaginal, Spontaneous; Gestational Age: 40w1d     Measurements:  Weight: 6 lb 10.9 oz (3030 g);  Height: 20\"    APGAR 1 minute 5 minutes 10 minutes   Totals: 8 9       UTILIZATION REVIEW CONTACT:  Isabella Nails, Utilization   Network Utilization Review Department  Phone: [phone]  Fax [phone]  Email: [email or web address]  Contact for approvals/pending authorizations, clinical reviews, and discharge.   PHYSICIAN ADVISORY SERVICES:  Medical Necessity Denial & Rpru-wy-Dnwr Review  Phone: [phone]  Fax: [phone]  Email: [email or web address]   DISCHARGE SUPPORT TEAM:  For Patients Discharge Needs & Updates  Phone: [phone] opt. 2 Fax: [phone]  Email: " [email or web address]

## 2025-07-25 NOTE — ASSESSMENT & PLAN NOTE
QBL 26; admit Hgb 13.8  - Continue routine postpartum care  - Pain mgt: IBU/ APAP   - encourage ambulation/ bonding   - lactation support   - PP contraception: uncertain

## 2025-07-25 NOTE — PLAN OF CARE
Problem: PAIN - ADULT  Goal: Verbalizes/displays adequate comfort level or baseline comfort level  Description: Interventions:  - Encourage patient to monitor pain and request assistance  - Assess pain using appropriate pain scale  - Administer analgesics as ordered based on type and severity of pain and evaluate response  - Implement non-pharmacological measures as appropriate and evaluate response  - Consider cultural and social influences on pain and pain management  - Notify physician/advanced practitioner if interventions unsuccessful or patient reports new pain  - Educate patient/family on pain management process including their role and importance of  reporting pain   - Provide non-pharmacologic/complimentary pain relief interventions  Outcome: Progressing     Problem: INFECTION - ADULT  Goal: Absence or prevention of progression during hospitalization  Description: INTERVENTIONS:  - Assess and monitor for signs and symptoms of infection  - Monitor lab/diagnostic results  - Monitor all insertion sites, i.e. indwelling lines, tubes, and drains  - Monitor endotracheal if appropriate and nasal secretions for changes in amount and color  - [place] appropriate cooling/warming therapies per order  - Administer medications as ordered  - Instruct and encourage patient and family to use good hand hygiene technique  - Identify and instruct in appropriate isolation precautions for identified infection/condition  Outcome: Progressing  Goal: Absence of fever/infection during neutropenic period  Description: INTERVENTIONS:  - Monitor WBC  - Perform strict hand hygiene  - Limit to healthy visitors only  - No plants, dried, fresh or silk flowers with pride in patient room  Outcome: Progressing     Problem: SAFETY ADULT  Goal: Patient will remain free of falls  Description: INTERVENTIONS:  - Educate patient/family on patient safety including physical limitations  - Instruct patient to call for assistance with activity   -  Consider consulting OT/PT to assist with strengthening/mobility based on AM PAC & JH-HLM score  - Consult OT/PT to assist with strengthening/mobility   - Keep Call bell within reach  - Keep bed low and locked with side rails adjusted as appropriate  - Keep care items and personal belongings within reach  - Initiate and maintain comfort rounds  - Make Fall Risk Sign visible to staff  - Apply yellow socks and bracelet for high fall risk patients  - Consider moving patient to room near nurses station  Outcome: Progressing  Goal: Maintain or return to baseline ADL function  Description: INTERVENTIONS:  -  Assess patient's ability to carry out ADLs; assess patient's baseline for ADL function and identify physical deficits which impact ability to perform ADLs (bathing, care of mouth/teeth, toileting, grooming, dressing, etc.)  - Assess/evaluate cause of self-care deficits   - Assess range of motion  - Assess patient's mobility; develop plan if impaired  - Assess patient's need for assistive devices and provide as appropriate  - Encourage maximum independence but intervene and supervise when necessary  - Involve family in performance of ADLs  - Assess for home care needs following discharge   - Consider OT consult to assist with ADL evaluation and planning for discharge  - Provide patient education as appropriate  - Monitor functional capacity and physical performance, use of AM PAC & JH-HLM   - Monitor gait, balance and fatigue with ambulation    Outcome: Progressing  Goal: Maintains/Returns to pre admission functional level  Description: INTERVENTIONS:  - Perform AM-PAC 6 Click Basic Mobility/ Daily Activity assessment daily.  - Set and communicate daily mobility goal to care team and patient/family/caregiver.   - Collaborate with rehabilitation services on mobility goals if consulted  - Out of bed for toileting  - Record patient progress and toleration of activity level   Outcome: Progressing     Problem: DISCHARGE  PLANNING  Goal: Discharge to home or other facility with appropriate resources  Description: INTERVENTIONS:  - Identify barriers to discharge w/patient and caregiver  - Arrange for needed discharge resources and transportation as appropriate  - Identify discharge learning needs (meds, wound care, etc.)  - Arrange for interpretive services to assist at discharge as needed  - Refer to Case Management Department for coordinating discharge planning if the patient needs post-hospital services based on physician/advanced practitioner order or complex needs related to functional status, cognitive ability, or social support system  Outcome: Progressing     Problem: Knowledge Deficit  Goal: Patient/family/caregiver demonstrates understanding of disease process, treatment plan, medications, and discharge instructions  Description: Complete learning assessment and assess knowledge base.  Interventions:  - Provide teaching at level of understanding  - Provide teaching via preferred learning methods  Outcome: Progressing  Goal: Verbalizes understanding of labor plan  Description: Assess patient/family/caregiver's baseline knowledge level and ability to understand information.  Provide education via patient/family/caregiver's preferred learning method at appropriate level of understanding.     1. Provide teaching at level of understanding.  2. Provide teaching via preferred learning method(s).  Outcome: Progressing     Problem: Labor & Delivery  Goal: Manages discomfort  Description: Assess and monitor for signs and symptoms of discomfort.  Assess patient's pain level regularly and per hospital policy.  Administer medications as ordered. Support use of nonpharmacological methods to help control pain such as distraction, imagery, relaxation, and application of heat and cold.  Collaborate with interdisciplinary team and patient to determine appropriate pain management plan.    1. Include patient in decisions related to comfort.  2.  Offer non-pharmacological pain management interventions.  3. Report ineffective pain management to physician.  Outcome: Progressing  Goal: Patient vital signs are stable  Description: 1. Assess vital signs - vaginal delivery.  Outcome: Progressing

## 2025-07-25 NOTE — DISCHARGE INSTR - ACTIVITY
Lactation notes:     Feedings should occur at least 8 times daily, but can range from 8-12 times in a 24 hour period. It is important to note that feeding should occur every 2-3 hours from the start of the previous feed (if the last feed was at 1:00, the next should be happening by 3:30 or 4:00 at the latest). In the first 24-48 hours of life especially, non-nutritive sucking is very common and is encouraged to stimulate milk production. The first days of life will consist of cluster feeding. An ideal feed should not last more than 30-45 minutes (this may vary as your infant matures, cluster feedings decrease, and your milk supply regulates), but base the feed off of infant behavior and not by watching the clock. This means to observe your baby for active feeding and how that differs from non-nutritive sucking/pacifying the breast. Offering both breasts during a feed is encouraged and is ideal, along with starting the next feed on the breast that you finished the previous feed on to regulate your milk supply (if you fed your baby starting on the right breast and ended on the left breast, begin the next feed on the left breast).     Second night syndrome is a term that describes the common experience of how the second day of a 's life can look drastically different from the first day of sleepiness, as the second day is usually characterized by cluster feedings, increased alertness and fussiness. While this behavioral shift can be scary and overwhelming for parents, it is important to understand that this is normal, healthy  behavior. This is your ’s way of adjusting from your womb to the world, and learning their needs for food and comfort. The cluster feeding is also helping to stimulate your milk supply by the  feeding so frequently, because this tells your brain that it needs to make more milk. Helpful tips for cluster feeding periods are to keep the baby skin to skin, and if hunger  cues are showing, put the baby to breast to see if they will eat. If either are not soothing your , you can swaddle them.     Achieving a good, deep latch is not only beneficial for mom to reduce nipple and breast pain, but to accomplish sufficient milk transfer to the baby.   Support your baby’s head by holding behind the ears at the nape of their neck and between their upper shoulder blades.  Align your baby’s nose to your nipple  Have your baby’s baby facing your belly  Your baby’s ear, shoulder, and hip should be in alignment  Use a “sandwich hold” with your hand to support your breast to provide an easier way for your baby to latch on deep enough  Bring your baby to your breast, not your breast to your baby  Your baby’s chin should be anchored into your breast for a deeper latch, as this allows your baby to elongate their airway and neck   Your baby’s upper and lower lips should be flanged  Your baby SHOULD NOT just be latched onto your nipple. This is a shallow latch.  If the latch hurts, break the seal by taking a clean finger and inserting it into the corner of your baby’s mouth. There should not be pain when your baby is feeding.

## 2025-07-25 NOTE — PROGRESS NOTES
"Progress Note - OB/GYN   Name: Krystal Barahona 35 y.o. female I MRN: [id number]  Unit/Bed#: -01 I Date of Admission: 2025   Date of Service: 2025 I Hospital Day: 2     Assessment & Plan   (spontaneous vaginal delivery)  QBL 26; admit Hgb 13.8  - Continue routine postpartum care  - Pain mgt: IBU/ APAP   - encourage ambulation/ bonding   - lactation support   - PP contraception: uncertain   Nonimmune to hepatitis B virus  Hep B vaccine declined   Rh negative state in antepartum period, third trimester  Baby O positive, s/p postpartum rhogam 25  Not immune to rubella  MMR ordered 25, pt considering vaccination   Maternal varicella, non-immune  Declined Varivax PP    Disposition    - Anticipate discharge home on PPD# 1-2      Subjective/Objective     Chief Complaint: Postpartum State     Subjective:    Krystal Barahona is PPD/POD#1 s/p  spontaneous vaginal delivery. She has no current complaints.  Pain is well controlled. She is recovering well and is stable. Desires discharge today    Feeding:  breast  Tolerating PO: yes  Nausea or Vomiting: no  Voiding: yes  Flatus: yes; has had no bowel movement.   Ambulating: yes  Chest pain: no  Shortness of breath: no  Leg pain: no  Lochia: moderate     Vitals:   /77 (BP Location: Left arm)   Pulse 61   Temp 98.2 °F (36.8 °C) (Oral)   Resp 16   Ht 5' 8\" (1.727 m)   Wt 72.1 kg (159 lb)   LMP 10/25/2024 (Approximate)   SpO2 99%   Breastfeeding Yes   BMI 24.18 kg/m²       Physical Exam:   GEN: Krystal Barahona appears well, alert and oriented x 3, pleasant and cooperative   CARDIO: RRR, no murmurs or rubs  RESP:  CTAB, no wheezes or rales  ABDOMEN: soft, no tenderness, no distention, fundus @ U  EXTREMITIES:  non tender, trace edema, b/l Sakshi's sign negative    Labs:     Hemoglobin   Date Value Ref Range Status   2025 13.8 11.5 - 15.4 g/dL Final   2025 10.9 (L) 11.5 - 15.4 g/dL Final     WBC   Date Value Ref Range Status   2025 " 13.35 (H) 4.31 - 10.16 Thousand/uL Final   05/16/2025 11.60 (H) 4.31 - 10.16 Thousand/uL Final     Platelets   Date Value Ref Range Status   07/23/2025 201 149 - 390 Thousands/uL Final   05/16/2025 221 149 - 390 Thousands/uL Final     Creatinine   Date Value Ref Range Status   01/20/2025 0.54 (L) 0.60 - 1.30 mg/dL Final     Comment:     Standardized to IDMS reference method   04/24/2024 0.79 0.57 - 1.00 mg/dL Final     AST   Date Value Ref Range Status   01/20/2025 14 13 - 39 U/L Final   04/24/2024 18 0 - 40 IU/L Final     ALT   Date Value Ref Range Status   01/20/2025 11 7 - 52 U/L Final     Comment:     Specimen collection should occur prior to Sulfasalazine administration due to the potential for falsely depressed results.    04/24/2024 14 0 - 32 IU/L Final          JOSH James  7/25/2025  6:33 AM

## 2025-07-28 ENCOUNTER — TELEPHONE (OUTPATIENT)
Age: 35
End: 2025-07-28

## 2025-07-28 ENCOUNTER — NURSE TRIAGE (OUTPATIENT)
Age: 35
End: 2025-07-28

## 2025-07-28 NOTE — UTILIZATION REVIEW
NOTIFICATION OF ADMISSION DISCHARGE   This is a Notification of Discharge from [facility]. Please be advised that this patient has been discharge from our facility. Below you will find the admission and discharge date and time including the patient’s disposition.   UTILIZATION REVIEW CONTACT:  Utilization Review Assistants  Network Utilization Review Department  Phone: [phone] x carefully listen to the prompts. All voicemails are confidential.  Email: [email or web address]     ADMISSION INFORMATION  PRESENTATION DATE: 7/23/2025  8:42 PM  OBERVATION ADMISSION DATE: N/A  INPATIENT ADMISSION DATE: 7/23/25  9:03 PM   DISCHARGE DATE: 7/25/2025  5:30 PM   DISPOSITION:Home/Self Care    Network Utilization Review Department  ATTENTION: Please call with any questions or concerns to [phone] and carefully listen to the prompts so that you are directed to the right person. All voicemails are confidential.   For Discharge needs, contact Care Management DC Support Team at [phone] opt. 2  Send all requests for admission clinical reviews, approved or denied determinations and any other requests to dedicated fax number below belonging to the campus where the patient is receiving treatment. List of dedicated fax numbers for the Facilities:  FACILITY NAME UR FAX NUMBER   ADMISSION DENIALS (Administrative/Medical Necessity) [phone]   DISCHARGE SUPPORT TEAM ([facility]) [phone]   PARENT CHILD HEALTH (Maternity/NICU/Pediatrics) [phone]   [facility] [phone]   [facility] [phone]   [facility] [phone]   [facility] [phone]   [facility] [phone]   [facility] [phone]   [facility] [phone]   [facility] [phone]   [facility]  [facility] [phone]   [facility] [phone]   [facility] [phone]   [facility] [phone]

## 2025-07-30 ENCOUNTER — NURSE TRIAGE (OUTPATIENT)
Dept: OTHER | Facility: OTHER | Age: 35
End: 2025-07-30

## 2025-07-31 ENCOUNTER — HOSPITAL ENCOUNTER (INPATIENT)
Facility: HOSPITAL | Age: 35
LOS: 2 days | Discharge: HOME | End: 2025-08-02
Attending: EMERGENCY MEDICINE | Admitting: OBSTETRICS & GYNECOLOGY
Payer: COMMERCIAL

## 2025-07-31 ENCOUNTER — TELEPHONE (OUTPATIENT)
Dept: OBGYN CLINIC | Facility: CLINIC | Age: 35
End: 2025-07-31

## 2025-07-31 ENCOUNTER — NURSE TRIAGE (OUTPATIENT)
Dept: OTHER | Facility: OTHER | Age: 35
End: 2025-07-31

## 2025-07-31 PROBLEM — O14.90 PREECLAMPSIA: Status: ACTIVE | Noted: 2025-07-31

## 2025-08-01 LAB — PLACENTA IN STORAGE: NORMAL

## 2025-08-03 ENCOUNTER — APPOINTMENT (INPATIENT)
Dept: MRI IMAGING | Facility: HOSPITAL | Age: 35
DRG: 776 | End: 2025-08-03
Payer: COMMERCIAL

## 2025-08-03 ENCOUNTER — APPOINTMENT (EMERGENCY)
Dept: CT IMAGING | Facility: HOSPITAL | Age: 35
DRG: 776 | End: 2025-08-03
Payer: COMMERCIAL

## 2025-08-03 ENCOUNTER — HOSPITAL ENCOUNTER (INPATIENT)
Facility: HOSPITAL | Age: 35
LOS: 2 days | Discharge: HOME | DRG: 776 | End: 2025-08-05
Admitting: STUDENT IN AN ORGANIZED HEALTH CARE EDUCATION/TRAINING PROGRAM
Payer: COMMERCIAL

## 2025-08-03 PROBLEM — R51.9 ACUTE HEADACHE: Status: ACTIVE | Noted: 2025-08-03

## 2025-08-03 PROBLEM — I65.03: Status: ACTIVE | Noted: 2025-08-03

## 2025-08-04 ENCOUNTER — TELEPHONE (OUTPATIENT)
Age: 35
End: 2025-08-04

## 2025-08-05 ENCOUNTER — TRANSITIONAL CARE MANAGEMENT (OUTPATIENT)
Dept: FAMILY MEDICINE CLINIC | Facility: CLINIC | Age: 35
End: 2025-08-05

## 2025-08-05 ENCOUNTER — TELEPHONE (OUTPATIENT)
Age: 35
End: 2025-08-05

## 2025-08-05 ENCOUNTER — TELEPHONE (OUTPATIENT)
Dept: LABOR AND DELIVERY | Facility: HOSPITAL | Age: 35
End: 2025-08-05

## 2025-08-05 ENCOUNTER — TELEPHONE (OUTPATIENT)
Dept: OBGYN CLINIC | Facility: MEDICAL CENTER | Age: 35
End: 2025-08-05

## 2025-08-05 ENCOUNTER — TELEPHONE (OUTPATIENT)
Dept: FAMILY MEDICINE CLINIC | Facility: CLINIC | Age: 35
End: 2025-08-05

## 2025-08-06 ENCOUNTER — OFFICE VISIT (OUTPATIENT)
Dept: FAMILY MEDICINE CLINIC | Facility: CLINIC | Age: 35
End: 2025-08-06
Payer: COMMERCIAL

## 2025-08-06 ENCOUNTER — HOME MONITORING (OUTPATIENT)
Dept: OTHER | Facility: HOSPITAL | Age: 35
End: 2025-08-06

## 2025-08-06 VITALS
HEART RATE: 100 BPM | WEIGHT: 138 LBS | RESPIRATION RATE: 18 BRPM | OXYGEN SATURATION: 99 % | DIASTOLIC BLOOD PRESSURE: 66 MMHG | TEMPERATURE: 98 F | HEIGHT: 68 IN | SYSTOLIC BLOOD PRESSURE: 100 MMHG | BODY MASS INDEX: 20.92 KG/M2

## 2025-08-06 DIAGNOSIS — R51.9 ACUTE NONINTRACTABLE HEADACHE, UNSPECIFIED HEADACHE TYPE: ICD-10-CM

## 2025-08-06 DIAGNOSIS — I65.03 VERTEBRAL ARTERY NARROWING, BILATERAL: ICD-10-CM

## 2025-08-06 DIAGNOSIS — O14.90 PRE-ECLAMPSIA, ANTEPARTUM: Primary | ICD-10-CM

## 2025-08-06 PROCEDURE — 99496 TRANSJ CARE MGMT HIGH F2F 7D: CPT | Performed by: NURSE PRACTITIONER

## 2025-08-06 RX ORDER — HYDRALAZINE HYDROCHLORIDE 25 MG/1
25 TABLET, FILM COATED ORAL EVERY 8 HOURS PRN
Qty: 90 TABLET | Refills: 0 | Status: SHIPPED | OUTPATIENT
Start: 2025-08-06 | End: 2025-08-06

## 2025-08-06 RX ORDER — HYDRALAZINE HYDROCHLORIDE 25 MG/1
25 TABLET, FILM COATED ORAL EVERY 8 HOURS PRN
Qty: 90 TABLET | Refills: 0 | Status: SHIPPED | OUTPATIENT
Start: 2025-08-06 | End: 2025-09-05

## 2025-08-07 ENCOUNTER — TELEPHONE (OUTPATIENT)
Dept: FAMILY MEDICINE CLINIC | Facility: CLINIC | Age: 35
End: 2025-08-07

## 2025-08-08 ENCOUNTER — TELEPHONE (OUTPATIENT)
Dept: VASCULAR SURGERY | Facility: CLINIC | Age: 35
End: 2025-08-08

## 2025-08-12 ENCOUNTER — HOME MONITORING (OUTPATIENT)
Dept: OTHER | Facility: HOSPITAL | Age: 35
End: 2025-08-12

## 2025-08-12 ENCOUNTER — OFFICE VISIT (OUTPATIENT)
Dept: FAMILY MEDICINE CLINIC | Facility: CLINIC | Age: 35
End: 2025-08-12
Payer: COMMERCIAL

## 2025-08-21 ENCOUNTER — OFFICE VISIT (OUTPATIENT)
Dept: POSTPARTUM | Facility: CLINIC | Age: 35
End: 2025-08-21
Payer: COMMERCIAL

## 2025-08-21 PROCEDURE — 99404 PREV MED CNSL INDIV APPRX 60: CPT | Performed by: PEDIATRICS
